# Patient Record
Sex: MALE | Race: WHITE | NOT HISPANIC OR LATINO
[De-identification: names, ages, dates, MRNs, and addresses within clinical notes are randomized per-mention and may not be internally consistent; named-entity substitution may affect disease eponyms.]

---

## 2019-01-08 PROBLEM — Z00.00 ENCOUNTER FOR PREVENTIVE HEALTH EXAMINATION: Status: ACTIVE | Noted: 2019-01-08

## 2019-01-11 ENCOUNTER — APPOINTMENT (OUTPATIENT)
Dept: VASCULAR SURGERY | Facility: CLINIC | Age: 84
End: 2019-01-11
Payer: MEDICARE

## 2019-01-11 PROCEDURE — 93880 EXTRACRANIAL BILAT STUDY: CPT

## 2019-01-11 PROCEDURE — 99203 OFFICE O/P NEW LOW 30 MIN: CPT

## 2019-01-18 NOTE — ASSESSMENT
[FreeTextEntry1] : carotid ultrasound shows no stenosis [Carotid Endarterectomy] : carotid endarterectomy

## 2019-01-18 NOTE — PHYSICAL EXAM
[2+] : left 2+ [Ankle Swelling (On Exam)] : not present [Varicose Veins Of Lower Extremities] : not present [] : not present [No Rash or Lesion] : No rash or lesion [Calm] : calm [de-identified] : pleasant

## 2019-01-18 NOTE — HISTORY OF PRESENT ILLNESS
[FreeTextEntry1] : pt comes in for evaluation of dizziness\par pt has been feeling light headed\par no weakness\par no visual changes\par no walking issues\par

## 2019-07-12 ENCOUNTER — EMERGENCY (EMERGENCY)
Facility: HOSPITAL | Age: 84
LOS: 1 days | Discharge: HOME | End: 2019-07-12
Attending: EMERGENCY MEDICINE | Admitting: EMERGENCY MEDICINE
Payer: MEDICARE

## 2019-07-12 VITALS
RESPIRATION RATE: 18 BRPM | HEART RATE: 55 BPM | SYSTOLIC BLOOD PRESSURE: 168 MMHG | OXYGEN SATURATION: 100 % | TEMPERATURE: 98 F | DIASTOLIC BLOOD PRESSURE: 66 MMHG

## 2019-07-12 VITALS
SYSTOLIC BLOOD PRESSURE: 179 MMHG | OXYGEN SATURATION: 99 % | TEMPERATURE: 98 F | HEIGHT: 69 IN | RESPIRATION RATE: 18 BRPM | DIASTOLIC BLOOD PRESSURE: 66 MMHG | WEIGHT: 165.79 LBS | HEART RATE: 66 BPM

## 2019-07-12 DIAGNOSIS — R07.81 PLEURODYNIA: ICD-10-CM

## 2019-07-12 DIAGNOSIS — S22.42XD MULTIPLE FRACTURES OF RIBS, LEFT SIDE, SUBSEQUENT ENCOUNTER FOR FRACTURE WITH ROUTINE HEALING: ICD-10-CM
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
## 2019-07-12 DIAGNOSIS — K86.9 DISEASE OF PANCREAS, UNSPECIFIED: ICD-10-CM

## 2019-07-12 PROCEDURE — 70450 CT HEAD/BRAIN W/O DYE: CPT | Mod: 26

## 2019-07-12 PROCEDURE — 99284 EMERGENCY DEPT VISIT MOD MDM: CPT | Mod: 25

## 2019-07-12 PROCEDURE — 71250 CT THORAX DX C-: CPT

## 2019-07-12 PROCEDURE — 70450 CT HEAD/BRAIN W/O DYE: CPT

## 2019-07-12 PROCEDURE — 73502 X-RAY EXAM HIP UNI 2-3 VIEWS: CPT

## 2019-07-12 PROCEDURE — 86901 BLOOD TYPING SEROLOGIC RH(D): CPT

## 2019-07-12 PROCEDURE — 86900 BLOOD TYPING SEROLOGIC ABO: CPT

## 2019-07-12 PROCEDURE — 85025 COMPLETE CBC W/AUTO DIFF WBC: CPT

## 2019-07-12 PROCEDURE — 85730 THROMBOPLASTIN TIME PARTIAL: CPT

## 2019-07-12 PROCEDURE — 36415 COLL VENOUS BLD VENIPUNCTURE: CPT

## 2019-07-12 PROCEDURE — 85610 PROTHROMBIN TIME: CPT

## 2019-07-12 PROCEDURE — 80053 COMPREHEN METABOLIC PANEL: CPT

## 2019-07-12 PROCEDURE — 73502 X-RAY EXAM HIP UNI 2-3 VIEWS: CPT | Mod: 26,LT

## 2019-07-12 PROCEDURE — 99284 EMERGENCY DEPT VISIT MOD MDM: CPT

## 2019-07-12 PROCEDURE — 86850 RBC ANTIBODY SCREEN: CPT

## 2019-07-12 PROCEDURE — 71250 CT THORAX DX C-: CPT | Mod: 26

## 2019-07-12 RX ORDER — TRAMADOL HYDROCHLORIDE 50 MG/1
1 TABLET ORAL
Qty: 12 | Refills: 0
Start: 2019-07-12 | End: 2019-07-14

## 2019-07-12 RX ORDER — TRAMADOL HYDROCHLORIDE 50 MG/1
50 TABLET ORAL ONCE
Refills: 0 | Status: DISCONTINUED | OUTPATIENT
Start: 2019-07-12 | End: 2019-07-12

## 2019-07-12 RX ADMIN — TRAMADOL HYDROCHLORIDE 50 MILLIGRAM(S): 50 TABLET ORAL at 16:57

## 2019-07-12 NOTE — ED PROVIDER NOTE - CLINICAL SUMMARY MEDICAL DECISION MAKING FREE TEXT BOX
Rib fractures s/p mechanical fall, sent to the ED for further eval. Will get CT chest for better evaluation, in addition to head CT in the setting on minor head injury in elderly, and add XR hip / pelvis. Analgesia. Dispo pending w/u and clinical status

## 2019-07-12 NOTE — ED PROVIDER NOTE - CARE PLAN
Principal Discharge DX:	Closed fracture of multiple ribs of left side with routine healing, subsequent encounter  Secondary Diagnosis:	Pancreatic mass

## 2019-07-12 NOTE — ED PROVIDER NOTE - NSFOLLOWUPINSTRUCTIONS_ED_ALL_ED_FT
You were referred to the ED for rib fractures. You had a CT of the chest which showed 2 acute rib fractures without complication. You had an incidental finding of cystic mass on the tail of the pancreas. This needs to be further evaluated as an outpatient by MRI. Please show your results to your primary doctor, Dr Ramirez. Your CT of the head, and xrays of the left hip, pelvis, and femur did not show any acute abnormalities. You are being prescribed Tramadol for pain. You have been given an incentive spirometer to practice deep breathing. Refrain from exercise for the next 4 weeks. Follow up with Dr Deal    Rib Fracture  Image   A rib fracture is a break or crack in one of the bones of the ribs. The ribs are long, curved bones that wrap around your chest and attach to your spine and your breastbone. The ribs protect your heart, lungs, and other organs in the chest.    A broken or cracked rib is often painful but is not usually serious. Most rib fractures heal on their own over time. However, rib fractures can be more serious if multiple ribs are broken or if broken ribs move out of place and push against other structures or organs.    What are the causes?  This condition is caused by:  Repetitive movements with high force, such as pitching a baseball or having severe coughing spells.  A direct blow to the chest, such as a sports injury, a car accident, or a fall.  Cancer that has spread to the bones, which can weaken bones and cause them to break.  What are the signs or symptoms?  Symptoms of this condition include:  Pain when you breathe in or cough.  Pain when someone presses on the injured area.  Feeling short of breath.  How is this diagnosed?  This condition is diagnosed with a physical exam and medical history. Imaging tests may also be done, such as:  Chest X-ray.  CT scan.  MRI.  Bone scan.  Chest ultrasound.  How is this treated?  Treatment for this condition depends on the severity of the fracture. Most rib fractures usually heal on their own in 1–3 months. Sometimes healing takes longer if there is a cough that does not stop or if there are other activities that make the injury worse (aggravating factors). While you heal, you will be given medicines to control the pain. You will also be taught deep breathing exercises.    Severe injuries may require hospitalization or surgery.    Follow these instructions at home:  Managing pain, stiffness, and swelling     If directed, apply ice to the injured area.  Put ice in a plastic bag.  Place a towel between your skin and the bag.  Leave the ice on for 20 minutes, 2–3 times a day.  Take over-the-counter and prescription medicines only as told by your health care provider.  Activity     Avoid a lot of activity and any activities or movements that cause pain. Be careful during activities and avoid bumping the injured rib.  Slowly increase your activity as told by your health care provider.  General instructions     Do deep breathing exercises as told by your health care provider. This helps prevent pneumonia, which is a common complication of a broken rib. Your health care provider may instruct you to:  Take deep breaths several times a day.  Try to cough several times a day, holding a pillow against the injured area.  Use a device called incentive spirometer to practice deep breathing several times a day.  Drink enough fluid to keep your urine pale yellow.  Do not wear a rib belt or binder. These restrict breathing, which can lead to pneumonia.  Keep all follow-up visits as told by your health care provider. This is important.  Contact a health care provider if:  You have a fever.  Get help right away if:  You have difficulty breathing or you are short of breath.  You develop a cough that does not stop, or you cough up thick or bloody sputum.  You have nausea, vomiting, or pain in your abdomen.  Your pain gets worse and medicine does not help.  Summary  A rib fracture is a break or crack in one of the bones of the ribs.  A broken or cracked rib is often painful but is not usually serious.  Most rib fractures heal on their own over time.  Treatment for this condition depends on the severity of the fracture.  Avoid a lot of activity and any activities or movements that cause pain.  This information is not intended to replace advice given to you by your health care provider. Make sure you discuss any questions you have with your health care provider.    How to Use an Incentive Spirometer    WHAT YOU NEED TO KNOW:    An incentive spirometer is a device that measures how deeply you can inhale (breathe in). It helps you take slow, deep breaths to expand and fill your lungs with air. This helps prevent lung problems, such as pneumonia. The incentive spirometer is made up of a breathing tube, an air chamber, and an indicator. The breathing tube is connected to the air chamber and has a mouthpiece at the end. The indicator is found inside the device.     DISCHARGE INSTRUCTIONS:    Reasons to use an incentive spirometer: An incentive spirometer is most commonly used after surgery. People who are at increased risk of airway or breathing problems may also use one. These include people who smoke or have lung disease. This may also include people who are not active or cannot move well.     How to use an incentive spirometer: Sit up as straight as possible. Do not bend your head forward or backward. Hold the incentive spirometer in an upright position. Place the target pointer to the level that you need to reach or that your healthcare provider has suggested. Exhale (breathe out) normally and then do the following:    Put the mouthpiece in your mouth and close your lips tightly around it. Do not block the mouthpiece with your tongue.       Inhale slowly and deeply through the mouthpiece to raise the indicator. Try to make the indicator rise up to the level of the goal marker.      When you cannot inhale any longer, remove the mouthpiece and hold your breath for at least 3 seconds.      Exhale normally.      Repeat these steps 10 to 12 times every hour when you are awake, or as often as directed.      Clean the mouthpiece with soap and water after each use. Do not use a disposable mouthpiece for longer than 24 hours.      Keep a log of the highest level you are able to reach each time. This will help healthcare providers see if your lung function improves.How to use and Incentive Spirometer         Follow up with your healthcare provider as directed: Write down your questions so you remember to ask them during your visits.     Contact your healthcare provider if:     You feel dizzy or lightheaded.      You have a wound that is painful every time you breathe deeply.      You have questions or concerns about how to use your IS.    Return to the emergency department if:     You have chest pain or shortness of breath.      You feel faint.

## 2019-07-12 NOTE — ED PROVIDER NOTE - OBJECTIVE STATEMENT
Pt w/ PMHx HLD, pre-DM, sent in from [facility] for rib fractures. Pt reports he was walking, tripped on uneven sidewalk, and fell forward, hitting his left chest and forehead. No LOC. Pt was helped up and able to ambulate of his own afterwards. No preceding CP, SOB, palpitations, or dizziness. Pt reports pain in the L chest wall only, and minimally L forehead s/p fall. No neck or back pain. Pt reports L "thigh" pain. Pt ambulated to [facility], where he had XR of the chest, L ribs, and L femur, was advised he had multiple rib fractures. PCP Dr Deal was called, and he was advised to come to the [facility] ED. Pt walked to the ED. Pt has not taken anything for pain. TDaP within 10 years. No AC use.

## 2019-07-12 NOTE — ED ADULT NURSE NOTE - NSIMPLEMENTINTERV_GEN_ALL_ED
Implemented All Universal Safety Interventions:  [place] to call system. Call bell, personal items and telephone within reach. Instruct patient to call for assistance. Room bathroom lighting operational. Non-slip footwear when patient is off stretcher. Physically safe environment: no spills, clutter or unnecessary equipment. Stretcher in lowest position, wheels locked, appropriate side rails in place.

## 2019-07-12 NOTE — ED PROVIDER NOTE - NS ED ROS FT
Constitutional: No fever or chills.   Eyes: No pain, blurry vision, or discharge.  ENMT: No hearing changes, pain, discharge or infections. No neck pain or stiffness.  Cardiac: No chest pain, SOB or edema. No chest pain with exertion.  Respiratory: No cough or respiratory distress. No hemoptysis. No history of asthma or RAD.  GI: No nausea, vomiting, diarrhea or abdominal pain.  : No dysuria, frequency or burning.  MS: See HPI.  Neuro: No headache or weakness. No LOC.  Skin: No skin rash.   Endocrine: No history of thyroid disease or diabetes.  Except as documented in the HPI, all other systems are negative.

## 2019-07-12 NOTE — ED PROVIDER NOTE - PROGRESS NOTE DETAILS
Outpt XR chest / rib series report "several L 3-7 ribs uncomplicated rib fx on CT. Incidental finding of pancreatic cystic mass told to pt, given a copy of report, advised of need for outpt MRI for further eval. At this time, ED rep attempting to contact PCP Dr Deal to notify as well. Pt is feeling better s/p analgesia and would like to go home. Will prescribe Tramadol, RN to give incentive spirometer. Advised to refrain from working out for the next 4 weeks. Outpt XR chest / rib series report "L 3-7 rib fractures"

## 2019-07-12 NOTE — ED ADULT TRIAGE NOTE - CHIEF COMPLAINT QUOTE
confirmed L rib Fx at urgent care after mechanical fall this am, denies CP/SOB at this time; EKG in progress

## 2019-07-12 NOTE — ED PROVIDER NOTE - DIAGNOSTIC INTERPRETATION
L hip / pelvis INTERPRETATION:  no acute fracture; no soft tissue swelling noted; normal bony alignment. L hip / pelvis INTERPRETATION:  no acute fracture; no soft tissue swelling noted; normal bony alignment.   Outpt Femur INTERPRETATION:  no acute fracture; no soft tissue swelling noted; normal bony alignment.

## 2019-07-12 NOTE — ED PROVIDER NOTE - PHYSICAL EXAMINATION
Constitutional: Well appearing, well nourished, awake, alert, oriented to person, place, time/situation and in no apparent distress.  Head: minimal abrasion just above L lateral eyebrow. No swelling or ecchymosis. Remainder of head / face unremarkable.   ENMT: Airway patent. Normal MM  Eyes: Clear bilaterally. PERRL. EOMI  Cardiac: Normal rate, regular rhythm.  Heart sounds S1, S2.  No murmurs, rubs or gallops.  Respiratory: Breaths sounds equal and clear b/l. No increased WOB, tachypnea, hypoxia, or accessory mm use. Pt speaks in full sentences.   Gastrointestinal: Abd soft, NT, ND, NABS. No guarding, rebound, or rigidity. No pulsatile abdominal masses. No organomegaly appreciated. No CVAT   Musculoskeletal: Range of motion is not limited. No midline spinal ttp throughout the spine. FROM neck w/o midline pain. FROM in all joints x 4 ext w/o signs of trauma or dec ROM. Pt points to "slight pain" in L thigh w/ ROM L hip only. Joints non tender. + mild ttp to palpation of L chest wall w/o visible signs of trauma, crepitus, or stepoffs.   Neuro: Alert and oriented x 3, face symmetric and speech fluent. Strength 5/5 x 4 ext and symmetric, nml gross motor movement, nml gait. No focal deficits noted.  Skin: Skin normal color for race, warm, dry and intact. No evidence of rash.  Psych: Alert and oriented to person, place, time/situation. normal mood and affect. no apparent risk to self or others.

## 2019-07-26 ENCOUNTER — TRANSCRIPTION ENCOUNTER (OUTPATIENT)
Age: 84
End: 2019-07-26

## 2020-07-31 NOTE — ED ADULT NURSE NOTE - CAS EDP DISCH TYPE
Visit date not found Last office visit was virtual on 5/19/2020, refill:    Date Department Ordering Authorizing   8/2/2019 [facility]-[address], MA David Laura MD   Outpatient Medication Detail      Disp Refills Start End    Olmesartan-amLODIPine-HCTZ (TRIBENZOR) 40-10-25 MG 90 tablet 3 8/2/2019     Sig: TAKE 1 TABLET DAILY IN THE MORNING      Recent elevated reading at specialty clinic, please advise.  Wt Readings from Last 1 Encounters:   07/28/20 64.2 kg        BP Readings from Last 2 Encounters:   07/28/20 (!) 145/71   06/03/20 128/70   ]    Lab Results   Component Value Date    SODIUM 135 07/28/2020    POTASSIUM 3.5 07/28/2020    CHLORIDE 96 (L) 07/28/2020    CO2 32 07/28/2020    BUN 14 07/28/2020    CREATININE 0.87 07/28/2020    GLUCOSE 105 (H) 07/28/2020     Hemoglobin A1C (%)   Date Value   05/15/2020 7.0 (H)   08/14/2019 6.3 (H)     No results found for: TSH  Lab Results   Component Value Date    CHOLESTEROL 145 02/20/2020    HDL 63 02/20/2020    CALCLDL 61 02/20/2020    TRIGLYCERIDE 106 02/20/2020     Lab Results   Component Value Date    AST 23 07/28/2020    GPT 38 07/28/2020    ALKPT 48 07/28/2020    BILIRUBIN 0.3 07/28/2020       
Home

## 2020-12-11 ENCOUNTER — EMERGENCY (EMERGENCY)
Facility: HOSPITAL | Age: 85
LOS: 1 days | Discharge: HOME | End: 2020-12-11
Attending: EMERGENCY MEDICINE | Admitting: EMERGENCY MEDICINE
Payer: MEDICARE

## 2020-12-11 VITALS
TEMPERATURE: 99 F | OXYGEN SATURATION: 97 % | HEIGHT: 69 IN | DIASTOLIC BLOOD PRESSURE: 69 MMHG | SYSTOLIC BLOOD PRESSURE: 161 MMHG | WEIGHT: 160.06 LBS | RESPIRATION RATE: 18 BRPM | HEART RATE: 86 BPM

## 2020-12-11 VITALS
OXYGEN SATURATION: 96 % | TEMPERATURE: 98 F | HEART RATE: 67 BPM | SYSTOLIC BLOOD PRESSURE: 139 MMHG | DIASTOLIC BLOOD PRESSURE: 67 MMHG | RESPIRATION RATE: 18 BRPM

## 2020-12-11 LAB
ALBUMIN SERPL ELPH-MCNC: 4.3 G/DL — SIGNIFICANT CHANGE UP (ref 3.3–5)
ALP SERPL-CCNC: 34 U/L — LOW (ref 40–120)
ALT FLD-CCNC: 24 U/L — SIGNIFICANT CHANGE UP (ref 10–45)
ANION GAP SERPL CALC-SCNC: 13 MMOL/L — SIGNIFICANT CHANGE UP (ref 5–17)
AST SERPL-CCNC: 19 U/L — SIGNIFICANT CHANGE UP (ref 10–40)
BASOPHILS # BLD AUTO: 0.03 K/UL — SIGNIFICANT CHANGE UP (ref 0–0.2)
BASOPHILS NFR BLD AUTO: 0.2 % — SIGNIFICANT CHANGE UP (ref 0–2)
BILIRUB SERPL-MCNC: 0.5 MG/DL — SIGNIFICANT CHANGE UP (ref 0.2–1.2)
BUN SERPL-MCNC: 25 MG/DL — HIGH (ref 7–23)
CALCIUM SERPL-MCNC: 9.6 MG/DL — SIGNIFICANT CHANGE UP (ref 8.4–10.5)
CHLORIDE SERPL-SCNC: 103 MMOL/L — SIGNIFICANT CHANGE UP (ref 96–108)
CK MB CFR SERPL CALC: 2.5 NG/ML — SIGNIFICANT CHANGE UP (ref 0–6.7)
CK SERPL-CCNC: 63 U/L — SIGNIFICANT CHANGE UP (ref 30–200)
CO2 SERPL-SCNC: 25 MMOL/L — SIGNIFICANT CHANGE UP (ref 22–31)
CREAT SERPL-MCNC: 1.03 MG/DL — SIGNIFICANT CHANGE UP (ref 0.5–1.3)
EOSINOPHIL # BLD AUTO: 0.01 K/UL — SIGNIFICANT CHANGE UP (ref 0–0.5)
EOSINOPHIL NFR BLD AUTO: 0.1 % — SIGNIFICANT CHANGE UP (ref 0–6)
GLUCOSE SERPL-MCNC: 138 MG/DL — HIGH (ref 70–99)
HCT VFR BLD CALC: 36.9 % — LOW (ref 39–50)
HGB BLD-MCNC: 12.2 G/DL — LOW (ref 13–17)
IMM GRANULOCYTES NFR BLD AUTO: 0.6 % — SIGNIFICANT CHANGE UP (ref 0–1.5)
LIDOCAIN IGE QN: 12 U/L — SIGNIFICANT CHANGE UP (ref 7–60)
LYMPHOCYTES # BLD AUTO: 1.05 K/UL — SIGNIFICANT CHANGE UP (ref 1–3.3)
LYMPHOCYTES # BLD AUTO: 8.7 % — LOW (ref 13–44)
MCHC RBC-ENTMCNC: 31.4 PG — SIGNIFICANT CHANGE UP (ref 27–34)
MCHC RBC-ENTMCNC: 33.1 GM/DL — SIGNIFICANT CHANGE UP (ref 32–36)
MCV RBC AUTO: 94.9 FL — SIGNIFICANT CHANGE UP (ref 80–100)
MONOCYTES # BLD AUTO: 1.4 K/UL — HIGH (ref 0–0.9)
MONOCYTES NFR BLD AUTO: 11.6 % — SIGNIFICANT CHANGE UP (ref 2–14)
NEUTROPHILS # BLD AUTO: 9.48 K/UL — HIGH (ref 1.8–7.4)
NEUTROPHILS NFR BLD AUTO: 78.8 % — HIGH (ref 43–77)
NRBC # BLD: 0 /100 WBCS — SIGNIFICANT CHANGE UP (ref 0–0)
PLATELET # BLD AUTO: 196 K/UL — SIGNIFICANT CHANGE UP (ref 150–400)
POTASSIUM SERPL-MCNC: 4 MMOL/L — SIGNIFICANT CHANGE UP (ref 3.5–5.3)
POTASSIUM SERPL-SCNC: 4 MMOL/L — SIGNIFICANT CHANGE UP (ref 3.5–5.3)
PROT SERPL-MCNC: 6.9 G/DL — SIGNIFICANT CHANGE UP (ref 6–8.3)
RBC # BLD: 3.89 M/UL — LOW (ref 4.2–5.8)
RBC # FLD: 12.7 % — SIGNIFICANT CHANGE UP (ref 10.3–14.5)
SODIUM SERPL-SCNC: 141 MMOL/L — SIGNIFICANT CHANGE UP (ref 135–145)
TROPONIN T SERPL-MCNC: 0.01 NG/ML — SIGNIFICANT CHANGE UP (ref 0–0.01)
WBC # BLD: 12.04 K/UL — HIGH (ref 3.8–10.5)
WBC # FLD AUTO: 12.04 K/UL — HIGH (ref 3.8–10.5)

## 2020-12-11 PROCEDURE — 99284 EMERGENCY DEPT VISIT MOD MDM: CPT | Mod: 25

## 2020-12-11 PROCEDURE — 82553 CREATINE MB FRACTION: CPT

## 2020-12-11 PROCEDURE — 84484 ASSAY OF TROPONIN QUANT: CPT

## 2020-12-11 PROCEDURE — 93010 ELECTROCARDIOGRAM REPORT: CPT

## 2020-12-11 PROCEDURE — 84145 PROCALCITONIN (PCT): CPT

## 2020-12-11 PROCEDURE — 36415 COLL VENOUS BLD VENIPUNCTURE: CPT

## 2020-12-11 PROCEDURE — 96375 TX/PRO/DX INJ NEW DRUG ADDON: CPT | Mod: XU

## 2020-12-11 PROCEDURE — 94640 AIRWAY INHALATION TREATMENT: CPT

## 2020-12-11 PROCEDURE — 96374 THER/PROPH/DIAG INJ IV PUSH: CPT | Mod: XU

## 2020-12-11 PROCEDURE — 71275 CT ANGIOGRAPHY CHEST: CPT | Mod: 26

## 2020-12-11 PROCEDURE — 71045 X-RAY EXAM CHEST 1 VIEW: CPT | Mod: 26

## 2020-12-11 PROCEDURE — 71045 X-RAY EXAM CHEST 1 VIEW: CPT

## 2020-12-11 PROCEDURE — 93005 ELECTROCARDIOGRAM TRACING: CPT

## 2020-12-11 PROCEDURE — 71275 CT ANGIOGRAPHY CHEST: CPT

## 2020-12-11 PROCEDURE — 86140 C-REACTIVE PROTEIN: CPT

## 2020-12-11 PROCEDURE — 82728 ASSAY OF FERRITIN: CPT

## 2020-12-11 PROCEDURE — 99285 EMERGENCY DEPT VISIT HI MDM: CPT

## 2020-12-11 PROCEDURE — 82550 ASSAY OF CK (CPK): CPT

## 2020-12-11 PROCEDURE — 80053 COMPREHEN METABOLIC PANEL: CPT

## 2020-12-11 PROCEDURE — 85025 COMPLETE CBC W/AUTO DIFF WBC: CPT

## 2020-12-11 PROCEDURE — 85379 FIBRIN DEGRADATION QUANT: CPT

## 2020-12-11 PROCEDURE — 83690 ASSAY OF LIPASE: CPT

## 2020-12-11 PROCEDURE — U0003: CPT

## 2020-12-11 RX ORDER — SODIUM CHLORIDE 9 MG/ML
1000 INJECTION INTRAMUSCULAR; INTRAVENOUS; SUBCUTANEOUS
Refills: 0 | Status: DISCONTINUED | OUTPATIENT
Start: 2020-12-11 | End: 2020-12-15

## 2020-12-11 RX ORDER — DEXAMETHASONE 0.5 MG/5ML
6 ELIXIR ORAL ONCE
Refills: 0 | Status: COMPLETED | OUTPATIENT
Start: 2020-12-11 | End: 2020-12-11

## 2020-12-11 RX ORDER — ALBUTEROL 90 UG/1
1 AEROSOL, METERED ORAL ONCE
Refills: 0 | Status: COMPLETED | OUTPATIENT
Start: 2020-12-11 | End: 2020-12-11

## 2020-12-11 RX ORDER — KETOROLAC TROMETHAMINE 30 MG/ML
15 SYRINGE (ML) INJECTION ONCE
Refills: 0 | Status: DISCONTINUED | OUTPATIENT
Start: 2020-12-11 | End: 2020-12-11

## 2020-12-11 RX ORDER — ACETAMINOPHEN 500 MG
650 TABLET ORAL ONCE
Refills: 0 | Status: COMPLETED | OUTPATIENT
Start: 2020-12-11 | End: 2020-12-11

## 2020-12-11 RX ADMIN — Medication 650 MILLIGRAM(S): at 18:56

## 2020-12-11 RX ADMIN — Medication 15 MILLIGRAM(S): at 21:40

## 2020-12-11 RX ADMIN — ALBUTEROL 1 PUFF(S): 90 AEROSOL, METERED ORAL at 22:33

## 2020-12-11 RX ADMIN — SODIUM CHLORIDE 500 MILLILITER(S): 9 INJECTION INTRAMUSCULAR; INTRAVENOUS; SUBCUTANEOUS at 20:21

## 2020-12-11 RX ADMIN — Medication 6 MILLIGRAM(S): at 22:33

## 2020-12-11 NOTE — ED ADULT NURSE NOTE - OBJECTIVE STATEMENT
Patient received to bed 5, A&Ox3, respirations even and unlabored, ambulatory, bilateral hearing aids observed, speaks in clear sentences. Patient arrives with complaints of non-radiating chest pain with worsening on deep inspiration x 1 day, prompting ED visit. Patient reports last fall "a few months ago I tripped and hit my nose, I got checked out by the hospital and was fine". Patient denies Hx MI. Left Ac 18g IV placed labs drawn and sent.

## 2020-12-11 NOTE — ED PROVIDER NOTE - PATIENT PORTAL LINK FT
You can access the FollowMyHealth Patient Portal offered by [facility] by registering at the following website: [email or web address]. By joining [facility]’s FollowMyHealth portal, you will also be able to view your health information using other applications (apps) compatible with our system.

## 2020-12-11 NOTE — ED PROVIDER NOTE - CLINICAL SUMMARY MEDICAL DECISION MAKING FREE TEXT BOX
86 y/o M with pleuritic chest pain, check labs, CXR, tylenol pO given. Will get CTA chest PE study and reassess.

## 2020-12-12 ENCOUNTER — TRANSCRIPTION ENCOUNTER (OUTPATIENT)
Age: 85
End: 2020-12-12

## 2020-12-12 LAB — SARS-COV-2 RNA SPEC QL NAA+PROBE: SIGNIFICANT CHANGE UP

## 2020-12-15 DIAGNOSIS — R07.89 OTHER CHEST PAIN: ICD-10-CM

## 2020-12-15 DIAGNOSIS — Z20.828 CONTACT WITH AND (SUSPECTED) EXPOSURE TO OTHER VIRAL COMMUNICABLE DISEASES: ICD-10-CM

## 2020-12-22 ENCOUNTER — INPATIENT (INPATIENT)
Facility: HOSPITAL | Age: 85
LOS: 3 days | Discharge: HOME | DRG: 178 | End: 2020-12-26
Attending: INTERNAL MEDICINE | Admitting: INTERNAL MEDICINE
Payer: MEDICARE

## 2020-12-22 VITALS
SYSTOLIC BLOOD PRESSURE: 136 MMHG | TEMPERATURE: 98 F | DIASTOLIC BLOOD PRESSURE: 66 MMHG | HEART RATE: 96 BPM | WEIGHT: 160.06 LBS | RESPIRATION RATE: 18 BRPM | OXYGEN SATURATION: 100 % | HEIGHT: 69 IN

## 2020-12-22 PROBLEM — N40.0 BENIGN PROSTATIC HYPERPLASIA WITHOUT LOWER URINARY TRACT SYMPTOMS: Chronic | Status: ACTIVE | Noted: 2020-12-11

## 2020-12-22 PROBLEM — E11.9 TYPE 2 DIABETES MELLITUS WITHOUT COMPLICATIONS: Chronic | Status: ACTIVE | Noted: 2020-12-11

## 2020-12-22 LAB
ALBUMIN SERPL ELPH-MCNC: 3.9 G/DL — SIGNIFICANT CHANGE UP (ref 3.3–5)
ALP SERPL-CCNC: 55 U/L — SIGNIFICANT CHANGE UP (ref 40–120)
ALT FLD-CCNC: 92 U/L — HIGH (ref 10–45)
ANION GAP SERPL CALC-SCNC: 15 MMOL/L — SIGNIFICANT CHANGE UP (ref 5–17)
APTT BLD: 23 SEC — LOW (ref 27.5–35.5)
AST SERPL-CCNC: 60 U/L — HIGH (ref 10–40)
BASOPHILS # BLD AUTO: 0.03 K/UL — SIGNIFICANT CHANGE UP (ref 0–0.2)
BASOPHILS NFR BLD AUTO: 0.2 % — SIGNIFICANT CHANGE UP (ref 0–2)
BILIRUB SERPL-MCNC: 0.5 MG/DL — SIGNIFICANT CHANGE UP (ref 0.2–1.2)
BUN SERPL-MCNC: 74 MG/DL — HIGH (ref 7–23)
CALCIUM SERPL-MCNC: 10.5 MG/DL — SIGNIFICANT CHANGE UP (ref 8.4–10.5)
CHLORIDE SERPL-SCNC: 101 MMOL/L — SIGNIFICANT CHANGE UP (ref 96–108)
CO2 SERPL-SCNC: 23 MMOL/L — SIGNIFICANT CHANGE UP (ref 22–31)
CREAT SERPL-MCNC: 2.11 MG/DL — HIGH (ref 0.5–1.3)
CRP SERPL-MCNC: 21.38 MG/DL — HIGH (ref 0–0.4)
D DIMER BLD IA.RAPID-MCNC: 1126 NG/ML DDU — HIGH
EOSINOPHIL # BLD AUTO: 0.04 K/UL — SIGNIFICANT CHANGE UP (ref 0–0.5)
EOSINOPHIL NFR BLD AUTO: 0.3 % — SIGNIFICANT CHANGE UP (ref 0–6)
GLUCOSE BLDC GLUCOMTR-MCNC: 121 MG/DL — HIGH (ref 70–99)
GLUCOSE SERPL-MCNC: 140 MG/DL — HIGH (ref 70–99)
HCT VFR BLD CALC: 32.8 % — LOW (ref 39–50)
HGB BLD-MCNC: 10.6 G/DL — LOW (ref 13–17)
IMM GRANULOCYTES NFR BLD AUTO: 0.6 % — SIGNIFICANT CHANGE UP (ref 0–1.5)
INR BLD: 1.29 — HIGH (ref 0.88–1.16)
LACTATE SERPL-SCNC: 0.7 MMOL/L — SIGNIFICANT CHANGE UP (ref 0.5–2)
LDH SERPL L TO P-CCNC: 341 U/L — HIGH (ref 50–242)
LYMPHOCYTES # BLD AUTO: 1.17 K/UL — SIGNIFICANT CHANGE UP (ref 1–3.3)
LYMPHOCYTES # BLD AUTO: 9 % — LOW (ref 13–44)
MCHC RBC-ENTMCNC: 31.2 PG — SIGNIFICANT CHANGE UP (ref 27–34)
MCHC RBC-ENTMCNC: 32.3 GM/DL — SIGNIFICANT CHANGE UP (ref 32–36)
MCV RBC AUTO: 96.5 FL — SIGNIFICANT CHANGE UP (ref 80–100)
MONOCYTES # BLD AUTO: 1.14 K/UL — HIGH (ref 0–0.9)
MONOCYTES NFR BLD AUTO: 8.8 % — SIGNIFICANT CHANGE UP (ref 2–14)
NEUTROPHILS # BLD AUTO: 10.47 K/UL — HIGH (ref 1.8–7.4)
NEUTROPHILS NFR BLD AUTO: 81.1 % — HIGH (ref 43–77)
NRBC # BLD: 0 /100 WBCS — SIGNIFICANT CHANGE UP (ref 0–0)
NT-PROBNP SERPL-SCNC: 1004 PG/ML — HIGH (ref 0–300)
PLATELET # BLD AUTO: 274 K/UL — SIGNIFICANT CHANGE UP (ref 150–400)
POTASSIUM SERPL-MCNC: 5.4 MMOL/L — HIGH (ref 3.5–5.3)
POTASSIUM SERPL-SCNC: 5.4 MMOL/L — HIGH (ref 3.5–5.3)
PROCALCITONIN SERPL-MCNC: 0.2 NG/ML — HIGH (ref 0.02–0.1)
PROT SERPL-MCNC: 7.2 G/DL — SIGNIFICANT CHANGE UP (ref 6–8.3)
PROTHROM AB SERPL-ACNC: 15.3 SEC — HIGH (ref 10.6–13.6)
RBC # BLD: 3.4 M/UL — LOW (ref 4.2–5.8)
RBC # FLD: 13.2 % — SIGNIFICANT CHANGE UP (ref 10.3–14.5)
SARS-COV-2 RNA SPEC QL NAA+PROBE: DETECTED
SODIUM SERPL-SCNC: 139 MMOL/L — SIGNIFICANT CHANGE UP (ref 135–145)
TROPONIN T SERPL-MCNC: 0.01 NG/ML — SIGNIFICANT CHANGE UP (ref 0–0.01)
TROPONIN T SERPL-MCNC: 0.04 NG/ML — CRITICAL HIGH (ref 0–0.01)
WBC # BLD: 12.93 K/UL — HIGH (ref 3.8–10.5)
WBC # FLD AUTO: 12.93 K/UL — HIGH (ref 3.8–10.5)

## 2020-12-22 PROCEDURE — 99285 EMERGENCY DEPT VISIT HI MDM: CPT

## 2020-12-22 PROCEDURE — 71045 X-RAY EXAM CHEST 1 VIEW: CPT | Mod: 26

## 2020-12-22 RX ORDER — COLCHICINE 0.6 MG
0.6 TABLET ORAL DAILY
Refills: 0 | Status: DISCONTINUED | OUTPATIENT
Start: 2020-12-22 | End: 2020-12-23

## 2020-12-22 RX ORDER — HEPARIN SODIUM 5000 [USP'U]/ML
5000 INJECTION INTRAVENOUS; SUBCUTANEOUS EVERY 12 HOURS
Refills: 0 | Status: DISCONTINUED | OUTPATIENT
Start: 2020-12-22 | End: 2020-12-22

## 2020-12-22 RX ORDER — SODIUM CHLORIDE 9 MG/ML
1000 INJECTION INTRAMUSCULAR; INTRAVENOUS; SUBCUTANEOUS
Refills: 0 | Status: DISCONTINUED | OUTPATIENT
Start: 2020-12-22 | End: 2020-12-23

## 2020-12-22 RX ORDER — ENOXAPARIN SODIUM 100 MG/ML
30 INJECTION SUBCUTANEOUS EVERY 24 HOURS
Refills: 0 | Status: DISCONTINUED | OUTPATIENT
Start: 2020-12-22 | End: 2020-12-23

## 2020-12-22 RX ORDER — CHLORHEXIDINE GLUCONATE 213 G/1000ML
1 SOLUTION TOPICAL
Refills: 0 | Status: DISCONTINUED | OUTPATIENT
Start: 2020-12-22 | End: 2020-12-23

## 2020-12-22 RX ORDER — SODIUM CHLORIDE 9 MG/ML
1000 INJECTION INTRAMUSCULAR; INTRAVENOUS; SUBCUTANEOUS ONCE
Refills: 0 | Status: COMPLETED | OUTPATIENT
Start: 2020-12-22 | End: 2020-12-22

## 2020-12-22 RX ORDER — INSULIN LISPRO 100/ML
VIAL (ML) SUBCUTANEOUS
Refills: 0 | Status: DISCONTINUED | OUTPATIENT
Start: 2020-12-22 | End: 2020-12-26

## 2020-12-22 RX ADMIN — SODIUM CHLORIDE 1000 MILLILITER(S): 9 INJECTION INTRAMUSCULAR; INTRAVENOUS; SUBCUTANEOUS at 17:00

## 2020-12-22 RX ADMIN — SODIUM CHLORIDE 1000 MILLILITER(S): 9 INJECTION INTRAMUSCULAR; INTRAVENOUS; SUBCUTANEOUS at 15:58

## 2020-12-22 RX ADMIN — ENOXAPARIN SODIUM 30 MILLIGRAM(S): 100 INJECTION SUBCUTANEOUS at 23:16

## 2020-12-22 RX ADMIN — SODIUM CHLORIDE 125 MILLILITER(S): 9 INJECTION INTRAMUSCULAR; INTRAVENOUS; SUBCUTANEOUS at 16:50

## 2020-12-22 RX ADMIN — Medication 0.6 MILLIGRAM(S): at 22:02

## 2020-12-22 NOTE — CHART NOTE - NSCHARTNOTEFT_GEN_A_CORE
Limited TTE by cardiology fellow on call for effusion  - Normal biventricular function  - Large circumferential pericardial effusion more prominent along R heart chambers  - Systolic collapse of RA  - Mild diastolic invagination of RV  - 50% respiratory variation of mitral E inflow velocity  - Above findings are consistent with early echocardiographic tamponade    D/w cardiology echo attending  Keshawn Hedrick MD PGY4

## 2020-12-22 NOTE — ED PROVIDER NOTE - OBJECTIVE STATEMENT
84 y/o m with PMH of DM, BPH presents to ED with generalized weakness x 2 weeks (seen on 12/10) for similar complaints as well as nonspecific chest pain.  Pt denies fever, chills, vomiting, diarrhea, urinary symptoms.  Had COVID swab on 12/10 which was negative.  Visited PMD today Dr. Eliel Deal who sent pt to ER for further evaluation.  As per nurse, when patient was walking to bed, he was unsteady on feet.  Does not usually walk with assistance.  Denies one sided weakness, numbness.  No headache or neck pain.

## 2020-12-22 NOTE — H&P ADULT - NSICDXPASTMEDICALHX_GEN_ALL_CORE_FT
PAST MEDICAL HISTORY:  BPH (benign prostatic hyperplasia)     DM (diabetes mellitus)     Hyperlipidemia

## 2020-12-22 NOTE — ED ADULT TRIAGE NOTE - CHIEF COMPLAINT QUOTE
Pt c/o mid sternal CP that began a few days ago associated w/ generalized weakness. Pt was evaluated by PMD who sent to ED. Pt reports he has been to ED twice for similar sx. Denies fever, chills, SOB, NVD, SOB, loss of taste or smell, sore throat.

## 2020-12-22 NOTE — H&P ADULT - HISTORY OF PRESENT ILLNESS
IN PROGRESS   IN PROGRESS  Pt c/o mid sternal CP that began a few days ago associated w/ generalized weakness. Pt was evaluated by PMD who sent to ED. Pt reports he has been to ED twice for similar sx. Denies fever, chills, SOB, NVD, SOB, loss of taste or smell, sore throa  T 97.8, HR 96, /66, RR 18, Spo2 ·    84 y/o m with PMH of DM, BPH presents to ED with generalized weakness x 2 weeks (seen on 12/10) for similar complaints as well as nonspecific chest pain.  Pt denies fever, chills, vomiting, diarrhea, urinary symptoms.  Had COVID swab on 12/10 which was negative.  Visited PMD today Dr. Eliel Deal who sent pt to ER for further evaluation.  As per nurse, when patient was walking to bed, he was unsteady on feet.  Does not usually walk with assistance.  Denies one sided weakness, numbness.  No headache or neck pain.  EKG showing NSR, Rate 94, , QRS 86, diffuse elevated ST/t waves     IN PROGRESS  86 y/o m with PMH of DM, BPH presents to ED with generalized weakness x 2 weeks (seen on 12/10) for similar complaints as well as nonspecific chest pain.  Pt denies fever, chills, vomiting, diarrhea, urinary symptoms.  Had COVID swab on 12/10 which was negative.  Visited PMD today Dr. Eliel Deal who sent pt to ER for further evaluation.  As per nurse, when patient was walking to bed, he was unsteady on feet.  Does not usually walk with assistance.  Denies one sided weakness, numbness.  No headache or neck pain.    Pt c/o mid sternal CP that began a few days ago associated w/ generalized weakness. Pt was evaluated by PMD who sent to ED. Pt reports he has been to ED twice for similar sx. Denies fever, chills, SOB, NVD, SOB, loss of taste or smell, sore throat    Chest pain started 2 weeks ago, non-radiating, sharp a/w only deep inspirations, not exertional, not positional/ on palpation, 5/10 on deep inspirations.   Says he is really active and goes to the gym daily where he does treadmill/eliptical work.   Besides some nonproductive cough, denied any F/C/SOB/non-pleuritic CP, Palpitations, Abd pain, N/V/changes in bowel/bladder habits. Denied any known sick contacts w/ COVID but endorses that until his CP sx began, had been going to his [employer] gym on a daily basis.     ED Course:  T 97.8, HR 96, /66, RR 18, Spo2 100% on RA  Labs notable for e  Imaging: Xray   EKG showing NSR, Rate 94, , QRS 86, diffuse elevated ST/t waves       IN PROGRESS  84 y/o m with PMH of DM, HLD, BPH presented to ED for CP.  Pt recently had COVID swab 12/10 which was negative.   Pt has had CP for the last 2-3 weeks, with no apparent inciting event. Described as non-radiating, sharp a/w only deep inspirations (not exertional, not positional), relieved when he takes shallower breaths, 5/10 pain scale on deep inspirations, stated that has been consistent i.e. not worsened recently. Denied any limits on exercise tolerance or any pain with exercise, stated that he is really active and goes to the gym daily where he does treadmill/eliptical work, which he did until this past weekend of Dec 18-19th. Also endorsed some nonproductive cough during this period as well, but denied any loss of taste or smell, sore throat, runny nose, or any symptoms of malaise/feeling unwell. Denied F/C/SOB/non-pleuritic CP, Palpitations, Abd pain, N/V/changes in bowel/bladder habits. Denied any known sick contacts w/ COVID but as above, had been going to his [employer] gym on a daily basis. On day of adm, he visited his PCP Dr. Eliel Deal who sent pt to ER for further evaluation.  Also per earlier documentation this adm, when patient was walking to bed, he was unsteady on feet; on talking to pt in ED on adm, he does say he has a hx of falls in the past as well as unsteadiness which he has managed by being more careful with watching where he steps and taking time to get up slowly from bed.  Does not walk with assistance.  Denied any new numbness/weakness/HA.     PMH: HLD, DM, BPH  PSH: None  Meds: Takes tamsulosin, metformin, fenofibrate  Allergies: NKDA  FH: None  SH: [address] in [address] , is independent at home, goes to gym daily to exercise on elliptical and treadmill, has not smoked a cigarette since he was 17, drinks around 1 glass (usually wine) daily, no hx of drug use.    ED Course:  T 97.8, HR 96, /66, RR 18, Spo2 100% on RA  Labs showing Trop T 0.04 (later 0.01) WBC 12.93, Hb 10.6, INR 1.29, PT 15.3, PTT 23, DD 1126, CRP 21.28, Lactate 0.7, K 5.4, Creat 2.11, BUN 74, AST 60, ALT 92, procal 0.20, Serum BNP 1004, COVID + (was previously negative 12/11/2020)  Imaging: Xray showing no acute pathology.  Bedside TTE showing n/l BIV function, large circumferential pericardial effusion more prominent along R heart chambers, systolic collapse of RA, mild diastolic invagination of RV, 50% respiratory variation of mitral E inflow velocity, findings c/w early echocardiographic tamponade; EKG showing NSR, Rate 94, , QRS 86, diffuse elevated ST/t waves  Received NS 1L x1, then put on NS @ 125 cc/hr. Then started per CCU team on colchicine 0.6 mg QD and Lovenox 30 mg Q24       IN PROGRESS  84 y/o m with PMH of DM, HLD, BPH presented to ED for CP.  Pt recently had COVID swab 12/10 which was negative.   Pt has had CP for the last 2-3 weeks, with no apparent inciting event. Described as non-radiating, sharp a/w only deep inspirations (not exertional, not positional), relieved when he takes shallower breaths, 5/10 pain scale on deep inspirations, stated that has been consistent i.e. not worsened recently. Denied any limits on exercise tolerance or any pain with exercise, stated that he is really active and goes to the gym daily where he does treadmill/eliptical work, which he did until this past weekend of Dec 18-19th. Also endorsed some nonproductive cough during this period as well, but denied any loss of taste or smell, sore throat, runny nose, or any symptoms of malaise/feeling unwell/ myalgias. Denied F/C/SOB/non-pleuritic CP, Palpitations, Abd pain, N/V/changes in bowel/bladder habits. Denied any known sick contacts w/ COVID but as above, had been going to his [employer] gym on a daily basis; has a GF who goes out into community, but was asymptomatic. On day of adm, he visited his PCP Dr. Eliel Deal who sent pt to ER for further evaluation.   Also per earlier documentation this adm, when patient was walking to bed, he was unsteady on feet; on talking to pt in ED on adm, he does say he has a hx of falls in the past as well as unsteadiness which he has managed by being more careful with watching where he steps and taking time to get up slowly from bed.  Does not walk with assistance.  Denied any new numbness/weakness/HA.     PMH: HLD, DM, BPH  PSH: None  Meds: Takes tamsulosin, metformin, fenofibrate  Allergies: NKDA  FH: None  SH: Lives in an [address], is independent at home, goes to gym daily to exercise on elliptical and treadmill, has not smoked a cigarette since he was 17, drinks around 1 glass (usually wine) daily, no hx of drug use.    ED Course:  T 97.8, HR 96, /66, RR 18, Spo2 100% on RA  Labs showing Trop T 0.04 (later 0.01) WBC 12.93, Hb 10.6, INR 1.29, PT 15.3, PTT 23, DD 1126, CRP 21.28, Lactate 0.7, K 5.4, Creat 2.11, BUN 74, AST 60, ALT 92, procal 0.20, Serum BNP 1004, COVID + (was previously negative 12/11/2020)  Imaging: Xray showing no acute pathology.  Bedside TTE showing n/l BIV function, large circumferential pericardial effusion more prominent along R heart chambers, systolic collapse of RA, mild diastolic invagination of RV, 50% respiratory variation of mitral E inflow velocity, findings c/w early echocardiographic tamponade; EKG showing NSR, Rate 94, , QRS 86, diffuse elevated ST/t waves  Received NS 1L x1, then put on NS @ 125 cc/hr. Then started per CCU team on colchicine 0.6 mg QD and Lovenox 30 mg Q24       86 y/o m with PMH of DM, HLD, BPH presented to ED for CP.  Pt recently had COVID swab 12/10 which was negative.   Pt has had CP for the last 2-3 weeks, with no apparent inciting event. Described as non-radiating, sharp a/w only deep inspirations (not exertional, not positional), relieved when he takes shallower breaths, 5/10 pain scale on deep inspirations, stated that has been consistent i.e. not worsened recently. Denied any limits on exercise tolerance or any pain with exercise, stated that he is really active and goes to the gym daily where he does treadmill/eliptical work, which he did until this past weekend of Dec 18-19th. Also endorsed some nonproductive cough during this period as well, but denied any loss of taste or smell, sore throat, runny nose, or any symptoms of malaise/feeling unwell/ myalgias. Denied F/C/SOB/non-pleuritic CP, Palpitations, Abd pain, N/V/changes in bowel/bladder habits. Denied any known sick contacts w/ COVID but as above, had been going to his [employer] gym on a daily basis; has a GF who goes out into community, but was asymptomatic. On day of adm, he visited his PCP Dr. Eliel Deal who sent pt to ER for further evaluation.   Also per earlier documentation this adm, when patient was walking to bed, he was unsteady on feet; on talking to pt in ED on adm, he does say he has a hx of falls in the past as well as unsteadiness which he has managed by being more careful with watching where he steps and taking time to get up slowly from bed.  Does not walk with assistance.  Denied any new numbness/weakness/HA.     PMH: HLD, DM, BPH  PSH: None  Meds: Documented tamsulosin, metformin, fenofibrate, pt stated he takes multivitamins and said yes when asked whether he takes medications for his prostate, DM, cholesterol, but will need formal med rec.   Allergies: NKDA  FH: None  SH: Lives in [address] , is independent at home, goes to gym daily to exercise on elliptical and treadmill, has not smoked a cigarette since he was 17, drinks around 1 glass (usually wine) daily, no hx of drug use.    ED Course:  T 97.8, HR 96, /66, RR 18, Spo2 100% on RA  Labs showing Trop T 0.04 (later 0.01) WBC 12.93, Hb 10.6, INR 1.29, PT 15.3, PTT 23, DD 1126, CRP 21.28, Lactate 0.7, K 5.4, Creat 2.11, BUN 74, AST 60, ALT 92, procal 0.20, Serum BNP 1004, COVID + (was previously negative 12/11/2020)  Imaging: Xray showing no acute pathology.  Bedside TTE showing n/l BIV function, large circumferential pericardial effusion more prominent along R heart chambers, systolic collapse of RA, mild diastolic invagination of RV, 50% respiratory variation of mitral E inflow velocity, findings c/w early echocardiographic tamponade; EKG showing NSR, Rate 94, , QRS 86, diffuse elevated ST/t waves  Received NS 1L x1, then put on NS @ 125 cc/hr. Then started per CCU team on colchicine 0.6 mg QD and Lovenox 30 mg Q24       84 y/o m with PMH of DM, HLD, BPH presented to ED for CP.  Pt recently had COVID swab 12/10 which was negative.   Pt has had CP for the last 2-3 weeks, with no apparent inciting event. Described as non-radiating, sharp a/w only deep inspirations (not exertional, not positional), relieved when he takes shallower breaths, 5/10 pain scale on deep inspirations, stated that has been consistent i.e. not worsened recently. Denied any limits on exercise tolerance or any pain with exercise, stated that he is really active and goes to the gym daily where he does treadmill/eliptical work, which he did until this past weekend of Dec 18-19th. Also endorsed some nonproductive cough during this period as well, but denied any loss of taste or smell, sore throat, runny nose, or any symptoms of malaise/feeling unwell/ myalgias. Denied F/C/SOB/non-pleuritic CP, Palpitations, Abd pain, N/V/changes in bowel/bladder habits. Denied any known sick contacts w/ COVID but as above, had been going to his [employer] gym on a daily basis; has a GF who goes out into community, but was asymptomatic. On day of adm, he visited his PCP Dr. Eliel Deal who sent pt to ER for further evaluation.   Also per earlier documentation this adm, when patient was walking to bed, he was unsteady on feet; on talking to pt in ED on adm, he does say he has a hx of falls in the past as well as unsteadiness which he has managed by being more careful with watching where he steps and taking time to get up slowly from bed.  Does not walk with assistance.  Denied any new numbness/weakness/HA.     PMH: HLD, DM, BPH  PSH: None  Meds: Documented tamsulosin, metformin, fenofibrate, pt stated he takes multivitamins and said yes when asked whether he takes medications for his prostate, DM, cholesterol, but will need formal med rec.   Allergies: NKDA  FH: None  SH: Lives in an [address] , is independent at home, goes to gym daily to exercise on elliptical and treadmill, has not smoked a cigarette since he was 17, drinks around 1 glass (usually wine) daily, no hx of drug use.    ED Course:  T 97.8, HR 96, /66, RR 18, Spo2 100% on RA  Labs showing Trop T 0.04 (later 0.01) WBC 12.93, Hb 10.6, INR 1.29, PT 15.3, PTT 23, DD 1126, CRP 21.28, Lactate 0.7, K 5.4, Creat 2.11, BUN 74, AST 60, ALT 92 (pt denied any recent tyl/NSAID use), procal 0.20, Serum BNP 1004, COVID + (was previously negative 12/11/2020).  Imaging: Xray initial read showing no acute pathology (later follow-up phone call with radiology showing lower lobe airpace opacities looking like early covid; LLL w/ small effusion as well).  Bedside TTE showing n/l BIV function, large circumferential pericardial effusion more prominent along R heart chambers, systolic collapse of RA, mild diastolic invagination of RV, 50% respiratory variation of mitral E inflow velocity, findings c/w early echocardiographic tamponade; EKG showing NSR, Rate 94, , QRS 86, diffuse elevated ST/t waves  Received NS 1L x1, then put on NS @ 125 cc/hr. Then started per CCU team on colchicine 0.6 mg QD and Lovenox 30 mg Q24       86 y/o m with PMH of DM, HLD, BPH presented to ED for CP.  Pt recently had COVID swab 12/10 which was negative.   Pt has had CP for the last 2-3 weeks, with no apparent inciting event. Described as non-radiating, sharp a/w only deep inspirations (not exertional, not positional), relieved when he takes shallower breaths, 5/10 pain scale on deep inspirations, stated that has been consistent i.e. not worsened recently. Denied any limits on exercise tolerance or any pain with exercise, stated that he is really active and goes to the gym daily where he does treadmill/eliptical work, which he did until this past weekend of Dec 18-19th. Also endorsed some nonproductive cough during this period as well, but denied any loss of taste or smell, sore throat, runny nose, or any symptoms of malaise/feeling unwell/ myalgias. Denied F/C/SOB/non-pleuritic CP, Palpitations, Abd pain, N/V/changes in bowel/bladder habits. Denied any known sick contacts w/ COVID but as above, had been going to his [employer] gym on a daily basis; has a GF who goes out into community, but was asymptomatic. On day of adm, he visited his PCP Dr. Eliel Deal who sent pt to ER for further evaluation.   Also per earlier documentation this adm, when patient was walking to bed, he was unsteady on feet; on talking to pt in ED on adm, he does say he has a hx of falls in the past as well as unsteadiness which he has managed by being more careful with watching where he steps and taking time to get up slowly from bed.  Does not walk with assistance.  Denied any new numbness/weakness/HA.     PMH: HLD, DM, BPH  PSH: None  Meds: Documented tamsulosin, metformin, fenofibrate, pt stated he takes multivitamins and said yes when asked whether he takes medications for his prostate, DM, cholesterol, but will need formal med rec.   Allergies: NKDA  FH: None  SH: Lives in [address] , is independent at home, goes to gym daily to exercise on elliptical and treadmill, has not smoked a cigarette since he was 17, drinks around 1 glass (usually wine) daily, no hx of drug use.    ED Course:  T 97.8, HR 96, /66, RR 18, Spo2 100% on RA  Labs showing Trop T 0.04 (later 0.01) WBC 12.93, Hb 10.6, INR 1.29, PT 15.3, PTT 23, DD 1126, CRP 21.28, Lactate 0.7, K 5.4, Creat 2.11, BUN 74, AST 60, ALT 92 (pt denied any recent tyl/NSAID use), procal 0.20, Serum BNP 1004, COVID + (was previously negative 12/11/2020).  Imaging: Xray initial read showing no acute pathology (later follow-up phone call with radiology showing lower lobe airpace opacities looking like early covid; LLL w/ small effusion as well).  Bedside TTE showing n/l BIV function, large circumferential pericardial effusion more prominent along R heart chambers, systolic collapse of RA, mild diastolic invagination of RV, 50% respiratory variation of mitral E inflow velocity, findings c/w early echocardiographic tamponade; EKG showing NSR, Rate 94, , QRS 86, diffuse elevated ST/t waves  Received NS 1L x1, then put on NS @ 125 cc/hr. Then started per CCU team on colchicine 0.6 mg QD (renal dosing) and Lovenox 30 mg Q24 (dosing for Creat clearance)

## 2020-12-22 NOTE — H&P ADULT - ASSESSMENT
Assessment and Plan:     Neurology:    Pulmonary:    Cardiology:    Pt with generalized weakness x 2 weeks, nonspecific chest pain, seen in ED on 12/10 with neg workup and neg cta chest - sent by Dr. Deal for further evaluation, ekg no changes, cardiac labs sent including ct head for unsteady gait, otherwise no other neuro deficits.  Will perform another covid swab.  EKG with diffuse elevation - ? pericarditis with trop elevated and INES - will do bedside echo r/o effusion.  Bedside echo with large effusion and early tamponade.  Cards fellow called to also perform echo and decide ccu vs tele.  As per cards, stable for tele.    Pt then covid positive and cards called back and will take patient to CCU given covid positive and pericarditis with effusion and early tamponade.    GI:  #Elevated LFTS    :  #BPH    Renal:    MSK:    Endocrine:  #DM    ID:    FEN: NPO, Replete lytes PRN K>4, Mg>2    PPx: Hep SQ, SCDs    Code: FULL CODE    Dispo: Patient requires continued monitoring in MICU Assessment and Plan: 86 y/o m with PMH of DM, HLD, BPH presented to ED for CP.    T 97.8, HR 96, /66, RR 18, Spo2 100% on RA  Labs showing Trop T 0.04 (later 0.01) WBC 12.93, Hb 10.6, INR 1.29, PT 15.3, PTT 23, DD 1126, CRP 21.28, Lactate 0.7, K 5.4, Creat 2.11, BUN 74, AST 60, ALT 92, procal 0.20, Serum BNP 1004, COVID + (was previously negative 12/11/2020)  Imaging: Xray showing no acute pathology.  Bedside TTE showing n/l BIV function, large circumferential pericardial effusion more prominent along R heart chambers, systolic collapse of RA, mild diastolic invagination of RV, 50% respiratory variation of mitral E inflow velocity, findings c/w early echocardiographic tamponade; EKG showing NSR, Rate 94, , QRS 86, diffuse elevated ST/t waves  Received NS 1L x1, then put on NS @ 125 cc/hr. Then started per CCU team on colchicine 0.6 mg QD and Lovenox 30 mg Q24    Cardiology:  #CP  #Pericarditis  #U/S findings w/ effusion and early ultrasonographic tamponade    #Elevated troponins  Pt with generalized weakness x 2 weeks, nonspecific chest pain, seen in ED on 12/10 with neg workup and neg cta chest - sent by Dr. Deal for further evaluation, ekg no changes, cardiac labs sent including ct head for unsteady gait, otherwise no other neuro deficits.  Will perform another covid swab.  EKG with diffuse elevation - ? pericarditis with trop elevated and INES - will do bedside echo r/o effusion.  Bedside echo with large effusion and early tamponade.  Cards fellow called to also perform echo and decide ccu vs tele.  As per cards, stable for tele.    Pt then covid positive and cards called back and will take patient to CCU given covid positive and pericarditis with effusion and early tamponade.    ID:   #COVID 19 infection    #Leukocytosis: pt's slightly elevated WBC most likely reactive     Neurology: No active issues  Pulmonary: No active issues    GI:  #Elevated LFTS    :  #BPH    Renal:  #INES    MSK: No active issues    Endocrine:  #DM    FEN: NPO, NS at 125 cc/hr. Replete lytes PRN K>4, Mg>2    PPx: Lovenox 30 mg Q24, SCDs    Code: FULL CODE    Dispo: Patient requires continued monitoring in [facility] Assessment and Plan: 86 y/o m with PMH of DM, HLD, BPH presented to ED for CP.    T 97.8, HR 96, /66, RR 18, Spo2 100% on RA  Labs showing Trop T 0.04 (later 0.01) WBC 12.93, Hb 10.6, INR 1.29, PT 15.3, PTT 23, DD 1126, CRP 21.28, Lactate 0.7, K 5.4, Creat 2.11, BUN 74, AST 60, ALT 92, procal 0.20, Serum BNP 1004, COVID + (was previously negative 12/11/2020)  Imaging: Xray showing no acute pathology.  Bedside TTE showing n/l BIV function, large circumferential pericardial effusion more prominent along R heart chambers, systolic collapse of RA, mild diastolic invagination of RV, 50% respiratory variation of mitral E inflow velocity, findings c/w early echocardiographic tamponade; EKG showing NSR, Rate 94, , QRS 86, diffuse elevated ST/t waves  Received NS 1L x1, then put on NS @ 125 cc/hr. Then started per CCU team on colchicine 0.6 mg QD and Lovenox 30 mg Q24    Cardiology:  #CP  #Pericarditis  #U/S findings w/ effusion and early ultrasonographic tamponade  -Patient presented with pleuritic CP 5/10 for 2-3 weeks that has been consistent, w/ EKG and U/S findings on adm c/f pericarditis, with U/S findings additionally c/f pericardial effusion w/ early tamponade. On adm, VSS (will address pt's oxygen requirement below) and pt looks very nontoxic and comfortable.  -In setting of pt's positive COVID test this adm as well as recent cough, most likely dx would be viral pericarditis.   -Started colchicine 0.6 mg QD, continue to monitor Spo2 and status  -Plan for pericardiocentesis tomorrow, will be NPO after MN    #Elevated troponins  -0.04 => 0.01 this adm. EKG showing mild ST elev but DIFFUSE, c/w pericarditis  -Elevated trops came down; was most likely demand ischemia    Pt with generalized weakness x 2 weeks, nonspecific chest pain, seen in ED on 12/10 with neg workup and neg cta chest - sent by Dr. Toyin for further evaluation, ekg no changes, cardiac labs sent including ct head for unsteady gait, otherwise no other neuro deficits.  Will perform another covid swab.  EKG with diffuse elevation - ? pericarditis with trop elevated and INES - will do bedside echo r/o effusion.  Bedside echo with large effusion and early tamponade.  Cards fellow called to also perform echo and decide ccu vs tele.  As per cards, stable for tele.    Pt then covid positive and cards called back and will take patient to CCU given covid positive and pericarditis with effusion and early tamponade.    ID:   #COVID 19 infection  94-95% on RA. Per guidelines, at the moment does not warrant dexamethasone/remdesivir    #Leukocytosis: pt's slightly elevated WBC most likely reactive     Neurology: No active issues  Pulmonary: No active issues    GI:  #Elevated LFTS    :  #BPH    Renal:  #INES    MSK: No active issues    Endocrine:  #DM    FEN: NPO, NS at 125 cc/hr. Replete lytes PRN K>4, Mg>2    PPx: Lovenox 30 mg Q24, SCDs    Code: FULL CODE    Dispo: Patient requires continued monitoring in [facility] Assessment and Plan:   84 y/o m with PMH of DM, HLD, BPH, presenting w/ pleuritic CP, found with pericarditis with early cardiac tamponade and positive covid test, being transferred to [facility] for monitoring.     Cardiology:  #CP  #Pericarditis    #U/S findings w/ effusion and early ultrasonographic tamponade physiology  -Patient presented with pleuritic CP 5/10 for 2-3 weeks that has been consistent, w/ EKG and U/S findings on adm c/f pericarditis, with U/S findings additionally c/f pericardial effusion w/ early tamponade physiology (see below for more detail). Serum BNP on adm 1004.   -Bedside TTE showing n/l BIV function, large circumferential pericardial effusion more prominent along R heart chambers, systolic collapse of RA, mild diastolic invagination of RV, 50% respiratory variation of mitral E inflow velocity, findings c/w early echocardiographic tamponade  -On adm, VSS and pt looks very nontoxic and comfortable. Some decreased HS on exam but otherwise no JVD appreciated, no hypotension. Lactate 0.7, no issues with forward flow  -In setting of pt's positive COVID test this adm as well as recent cough, most likely dx would be viral pericarditis.   -Started colchicine 0.6 mg QD, continue to monitor Spo2 and status  -Plan for pericardiocentesis tomorrow, will be NPO after MN    #Elevated troponins  -0.04 => 0.01 this adm. EKG showing mild ST elev but DIFFUSE, c/w pericarditis  -Elevated trops came down; was most likely demand ischemia    ID:   #COVID 19 infection  -12/22/20 on adm, COVID + (was previously negative 12/11/2020). CXR obtained on adm not significantly changed from 12/11 ER visit; at that visit, CT angio chest w/out ggo; granted, covid test negative that visit.   -When pt seen in ED by us, was 94-95% when put on RA. Per guidelines, at the moment does not warrant dexamethasone/remdesivir  -DD 1126, CRP 21.28, continue to monitor ferritin,DD,CRP  -Monitor on RA, satting 95% on RA as of 12/23 around 2:30 AM when seen in [facility] room.     #Leukocytosis: pt's slightly elevated WBC most likely from covid infection as above, continue COVID mgmt as above    Neurology: No active issues  Pulmonary: No active issues    GI:  #Elevated LFTs:  -On adm, AST 60, ALT 92  -On hx, pt denied taking NSAIDs/tylenol frequently. Possibly covid-associated transaminitis  -Continue to monitor LFTs, MELD labs    :  #BPH:  -On flomax at home, will continue  -Will additionally perform med rec in am to fully ascertain pt's medicine regimen, he says he takes around 6 pills but couldn't remember their names.     Renal:  #INES  -Creat 2.11, BUN 74  -1.03 in past visit earlier this month.   -Pt looked dry on exam, and urine lytes (eg NAYELI < 20) were consistent with pre-renal picture. S/p 1L NS in ED, and on NS at 125 cc/hr for now. Will f/u creatinine.   -Avoid nephrotoxins when possible, continue to renally dose medications     MSK: No active issues    Metabolic:  #Hyperkalemia  -Mildy hyperkalemic on adm to 5.4. No EKG findings of hyperkalemia. Likely in setting of pt's INES, will f/u next BMP, if persistently elevated or higher, will consider lokelma.     Endocrine:  #DM:   -Has hx of DM, on metformin per chart records. Will do formal med rec in AM.  -mISS for now  -f/u am A1c    Heme:  #Anemia:  -Hb 10.6, MCV 96.5.   -No signs of bleeding  -No past records of labs on file found  -F/u iron studies    #Mildly elevated INR   -Adm INR 1.29, PT 15.3, PTT 23.  -Pt also with some mildly elevated transaminitis as discussed above. Albumin n/l.   -Will monitor    FEN: NPO, NS at 125 cc/hr. Replete lytes PRN K>4, Mg>2    PPx: Lovenox 30 mg Q24    Code: FULL CODE    Dispo: Patient requires continued monitoring in [facility] Assessment and Plan:   86 y/o m with PMH of DM, HLD, BPH, presenting w/ pleuritic CP, found with pericarditis with early cardiac tamponade and positive covid test, being transferred to [facility] for monitoring.     Cardiology:  #CP  #Pericarditis    #U/S findings w/ effusion and early ultrasonographic tamponade physiology  -Patient presented with pleuritic CP 5/10 for 2-3 weeks that has been consistent, w/ EKG and U/S findings on adm c/f pericarditis, with U/S findings additionally c/f pericardial effusion w/ early tamponade physiology (see below for more detail). Serum BNP on adm 1004.   -Bedside TTE showing n/l BIV function, large circumferential pericardial effusion more prominent along R heart chambers, systolic collapse of RA, mild diastolic invagination of RV, 50% respiratory variation of mitral E inflow velocity, findings c/w early echocardiographic tamponade  -On adm, VSS and pt looks very nontoxic and comfortable. Some decreased HS on exam but otherwise no JVD appreciated, no hypotension. Lactate 0.7, no issues with forward flow  -In setting of pt's positive COVID test this adm as well as recent cough, most likely dx would be viral pericarditis.   -Started colchicine 0.6 mg QD because of renal dosing, continue to monitor Spo2 and status  -Plan for pericardiocentesis tomorrow, will be NPO after MN    #Elevated troponins  -0.04 => 0.01 this adm. EKG showing mild ST elev but DIFFUSE, c/w pericarditis  -Elevated trops came down; was most likely demand ischemia    ID:   #COVID 19 infection  -12/22/20 on adm, COVID + (was previously negative 12/11/2020). CXR obtained on adm not significantly changed from 12/11 ER visit; at that visit, CT angio chest w/out ggo; granted, covid test negative that visit.   -When pt seen in ED by us, was 94-95% when put on RA. Per guidelines, at the moment does not warrant dexamethasone/remdesivir  -DD 1126, CRP 21.28, continue to monitor ferritin,DD,CRP  -Monitor on RA, satting 95% on RA as of 12/23 around 2:30 AM when seen in [facility] room.     #Leukocytosis: pt's slightly elevated WBC most likely from covid infection as above, continue COVID mgmt as above    Neurology: No active issues  Pulmonary: No active issues    GI:  #Elevated LFTs:  -On adm, AST 60, ALT 92  -On hx, pt denied taking NSAIDs/tylenol frequently. Possibly covid-associated transaminitis  -Continue to monitor LFTs, MELD labs    :  #BPH:  -On flomax at home, will continue  -Will additionally perform med rec in am to fully ascertain pt's medicine regimen, he says he takes around 6 pills but couldn't remember their names.     Renal:  #INES  -Creat 2.11, BUN 74  -1.03 in past visit earlier this month.   -Pt looked dry on exam, and urine lytes (eg NAYELI < 20) were consistent with pre-renal picture. S/p 1L NS in ED, and on NS at 125 cc/hr for now. Will f/u creatinine.   -Avoid nephrotoxins when possible, continue to renally dose medications     MSK: No active issues    Metabolic:  #Hyperkalemia  -Mildy hyperkalemic on adm to 5.4. No EKG findings of hyperkalemia. Likely in setting of pt's INES, will f/u next BMP, if persistently elevated or higher, will consider lokelma.     Endocrine:  #DM:   -Has hx of DM, on metformin per chart records. Will do formal med rec in AM.  -mISS for now  -f/u am A1c    Heme:  #Anemia:  -Hb 10.6, MCV 96.5.   -No signs of bleeding  -No past records of labs on file found  -F/u iron studies    #Mildly elevated INR   -Adm INR 1.29, PT 15.3, PTT 23.  -Pt also with some mildly elevated transaminitis as discussed above. Albumin n/l.   -Will monitor    FEN: NPO, NS at 125 cc/hr. Replete lytes PRN K>4, Mg>2    PPx: Lovenox 30 mg Q24    Code: FULL CODE    Dispo: Patient requires continued monitoring in [facility]

## 2020-12-22 NOTE — ED ADULT NURSE NOTE - OBJECTIVE STATEMENT
Pt is an 84 y/o male A&Ox4 in NAD c/o chest pain and generalized weakness x two weeks. Pt reports being seen in ED 2x in past two weeks for same complaint. Pt noted to have unsteady gait and require assistance to ambulate, pt states "I never used a cane before but now I am so weak I might have to." Pt denies N/V/D, fever/chills, loss of taste/smell. Pt talking in clear, full sentences, respirations even and unlabored, neuro intact, EKG done and signed, pt placed on CCM, PIV placed, labs sent.

## 2020-12-22 NOTE — H&P ADULT - NSHPSOCIALHISTORY_GEN_ALL_CORE
[address] in an [address] , is independent at home, goes to gym daily to exercise on elliptical and treadmill, has not smoked a cigarette since he was 17, drinks around 1 glass (usually wine) daily, no hx of drug use.

## 2020-12-22 NOTE — ED PROVIDER NOTE - PROGRESS NOTE DETAILS
seda- cardiology fellow did bedside echo, agrees moderate effusion w/ scalping of R ventricle, no full tamponade. Patient's VSS, pending dispo for CCU to [facility] or [facility]

## 2020-12-22 NOTE — H&P ADULT - NSHPLABSRESULTS_GEN_ALL_CORE
LABS:                         10.6   12.93 >-----< 274           ( 12-22-20 @ 15:48 )             32.8       139  |  101  |   74  ----------------------< 140    (12-22-20 @ 15:49)     5.4  |  23  |  2.11    Anion Gap: 15    Ca   10.5   (12-22-20 @ 15:49)  Mg   x    (12-22-20 @ 15:49)  Phos x    (12-22-20 @ 15:49)       TP 10.5     |  AST 3.9    -------------------------     Alb x     |  ALT x               (12-22-20 @ 15:49)  -------------------------     T-bili 3.9  |  AlkPh 55    D-bili x   COAGULATION STUDIES:     aPTT  23.0 sec    (12-22-20 @ 15:48)     PT     15.3 sec    (12-22-20 @ 15:48)     INR    1.29          (12-22-20 @ 15:48)     POCT Blood Glucose.: 121 mg/dL (12-22-20 @ 21:55)    I/O SUMMARY:

## 2020-12-22 NOTE — ED PROVIDER NOTE - CLINICAL SUMMARY MEDICAL DECISION MAKING FREE TEXT BOX
Pt with generalized weakness x 2 weeks, nonspecific chest pain, seen in ED on 12/10 with neg workup and neg cta chest - sent by Dr. Deal for further evaluation, ekg no changes, cardiac labs sent including ct head for unsteady gait, otherwise no other neuro deficits.  Will perform another covid swab. Pt with generalized weakness x 2 weeks, nonspecific chest pain, seen in ED on 12/10 with neg workup and neg cta chest - sent by Dr. Deal for further evaluation, ekg no changes, cardiac labs sent including ct head for unsteady gait, otherwise no other neuro deficits.  Will perform another covid swab.  EKG with diffuse elevation - ? pericarditis with trop elevated and INES - will do bedside echo r/o effusion. Pt with generalized weakness x 2 weeks, nonspecific chest pain, seen in ED on 12/10 with neg workup and neg cta chest - sent by Dr. Deal for further evaluation, ekg no changes, cardiac labs sent including ct head for unsteady gait, otherwise no other neuro deficits.  Will perform another covid swab.  EKG with diffuse elevation - ? pericarditis with trop elevated and INES - will do bedside echo r/o effusion.  Bedside echo with large effusion and early tamponade.  Cards fellow called to also perform echo and decide ccu vs tele.  As per cards, stable for tele. Pt with generalized weakness x 2 weeks, nonspecific chest pain, seen in ED on 12/10 with neg workup and neg cta chest - sent by Dr. Deal for further evaluation, ekg no changes, cardiac labs sent including ct head for unsteady gait, otherwise no other neuro deficits.  Will perform another covid swab.  EKG with diffuse elevation - ? pericarditis with trop elevated and INES - will do bedside echo r/o effusion.  Bedside echo with large effusion and early tamponade.  Cards fellow called to also perform echo and decide ccu vs tele.  As per cards, stable for tele.    Pt then covid positive and cards called back and will take patient to CCU given covid positive and pericarditis with effusion and early tamponade.

## 2020-12-22 NOTE — H&P ADULT - NSHPPHYSICALEXAM_GEN_ALL_CORE
VITAL SIGNS:  T(C): 36.9 (12-22-20 @ 20:38), Max: 37.6 (12-22-20 @ 15:48)  T(F): 98.5 (12-22-20 @ 20:38), Max: 99.6 (12-22-20 @ 15:48)  HR: 84 (12-22-20 @ 22:15) (84 - 96)  BP: 149/77 (12-22-20 @ 22:15) (104/64 - 149/77)  BP(mean): --  RR: 18 (12-22-20 @ 22:15) (18 - 18)  SpO2: 97% (12-22-20 @ 22:15) (91% - 100%)  Wt(kg): --    PHYSICAL EXAM:  Constitutional: WDWN resting comfortably in bed; NAD  Head: NC/AT  Eyes: PERRL, EOMI, anicteric sclera  ENT: no nasal discharge; uvula midline, no oropharyngeal erythema or exudates; MMM  Neck: supple; no JVD or thyromegaly  Respiratory: CTA B/L; no W/R/R, no retractions  Cardiac: +S1/S2; RRR; no M/R/G; PMI non-displaced  Gastrointestinal: abdomen soft, NT/ND; no rebound or guarding; +BSx4  Back: spine midline, no bony tenderness or step-offs; no CVAT B/L  Extremities: WWP, no clubbing or cyanosis; no peripheral edema  Musculoskeletal: NROM x4; no joint swelling, tenderness or erythema  Vascular: 2+ radial, femoral, DP/PT pulses B/L  Dermatologic: skin warm, dry and intact; no rashes, wounds, or scars  Lymphatic: no submandibular or cervical LAD  Neurologic: AAOx3; CNII-XII grossly intact; no focal deficits  Psychiatric: affect and characteristics of appearance, verbalizations, behaviors are appropriate VITAL SIGNS:  T(C): 36.9 (12-22-20 @ 20:38), Max: 37.6 (12-22-20 @ 15:48)  T(F): 98.5 (12-22-20 @ 20:38), Max: 99.6 (12-22-20 @ 15:48)  HR: 84 (12-22-20 @ 22:15) (84 - 96)  BP: 149/77 (12-22-20 @ 22:15) (104/64 - 149/77)  BP(mean): --  RR: 18 (12-22-20 @ 22:15) (18 - 18)  SpO2: 97% (12-22-20 @ 22:15) (91% - 100%)  Wt(kg): --    PHYSICAL EXAM:  Constitutional: Looks well for stated age, resting comfortably in bed and conversant; NAD  Head: NC/AT  Eyes: PERRL, EOMI, anicteric sclera  ENT: no nasal discharge; uvula midline, no oropharyngeal erythema or exudates; MM looking dry   Neck: supple; no JVD or thyromegaly  Respiratory: Excursions more shallow, limited slightly by pleuritic CP. Trace crackles RLL, otherwise CTAB; no W/R/R, no retractions or tachypnea or signs of resp distress  Cardiac: +S1/S2; RRR; no M/R/G; PMI non-displaced. No TTP over chest wall. No chest pain on transition from laying down at 30 degrees to sitting up in bed to breathe during lung examination  Gastrointestinal: abdomen soft and full, NTTP no rebound or guarding; +BSx4  Extremities: Extremities cool, no clubbing or cyanosis; no peripheral edema  Musculoskeletal: NROM x4; no joint swelling, tenderness or erythema  Vascular: 2+ radial, femoral, DP/PT pulses B/L  Dermatologic: skin cool, dry and intact; no rashes, wounds, or scars  Lymphatic: no submandibular or cervical LAD  Neurologic: AAOx3; CNII-XII grossly intact; no focal deficits  Psychiatric: affect and characteristics of appearance, verbalizations, behaviors are appropriate VITAL SIGNS:  T(C): 36.9 (12-22-20 @ 20:38), Max: 37.6 (12-22-20 @ 15:48)  T(F): 98.5 (12-22-20 @ 20:38), Max: 99.6 (12-22-20 @ 15:48)  HR: 84 (12-22-20 @ 22:15) (84 - 96)  BP: 149/77 (12-22-20 @ 22:15) (104/64 - 149/77)  BP(mean): --  RR: 18 (12-22-20 @ 22:15) (18 - 18)  SpO2: 97% (12-22-20 @ 22:15) (91% - 100%)  Wt(kg): --    PHYSICAL EXAM:  Constitutional: Looks well for stated age, resting comfortably in bed and conversant; NAD  Head: NC/AT  Eyes: PERRL, EOMI, anicteric sclera  ENT: no nasal discharge; uvula midline, no oropharyngeal erythema or exudates; MM looking dry   Neck: supple; no JVD or thyromegaly  Respiratory: Excursions more shallow, limited slightly by pleuritic CP. Trace crackles RLL, otherwise CTAB; no W/R/R, no retractions or tachypnea or signs of resp distress  Cardiac: Some decreased heart sounds; +S1/S2; RRR; no M/R/G; PMI non-displaced. No TTP over chest wall. No chest pain on transition from laying down at 30 degrees to sitting up in bed to breathe during lung examination  Gastrointestinal: abdomen soft and full, NTTP no rebound or guarding; +BSx4  Extremities: Extremities cool, no clubbing or cyanosis; no peripheral edema  Musculoskeletal: NROM x4; no joint swelling, tenderness or erythema  Vascular: 2+ radial, femoral, DP/PT pulses B/L  Dermatologic: skin cool, dry and intact; no rashes, wounds, or scars  Lymphatic: no submandibular or cervical LAD  Neurologic: AAOx3; CNII-XII grossly intact; no focal deficits  Psychiatric: affect and characteristics of appearance, verbalizations, behaviors are appropriate

## 2020-12-23 LAB
A1C WITH ESTIMATED AVERAGE GLUCOSE RESULT: 6.5 % — HIGH (ref 4–5.6)
ALBUMIN SERPL ELPH-MCNC: 3.3 G/DL — SIGNIFICANT CHANGE UP (ref 3.3–5)
ALP SERPL-CCNC: 51 U/L — SIGNIFICANT CHANGE UP (ref 40–120)
ALT FLD-CCNC: 70 U/L — HIGH (ref 10–45)
ANION GAP SERPL CALC-SCNC: 14 MMOL/L — SIGNIFICANT CHANGE UP (ref 5–17)
AST SERPL-CCNC: 26 U/L — SIGNIFICANT CHANGE UP (ref 10–40)
BASOPHILS # BLD AUTO: 0.03 K/UL — SIGNIFICANT CHANGE UP (ref 0–0.2)
BASOPHILS NFR BLD AUTO: 0.3 % — SIGNIFICANT CHANGE UP (ref 0–2)
BILIRUB SERPL-MCNC: 0.4 MG/DL — SIGNIFICANT CHANGE UP (ref 0.2–1.2)
BUN SERPL-MCNC: 63 MG/DL — HIGH (ref 7–23)
CALCIUM SERPL-MCNC: 9.1 MG/DL — SIGNIFICANT CHANGE UP (ref 8.4–10.5)
CHLORIDE SERPL-SCNC: 108 MMOL/L — SIGNIFICANT CHANGE UP (ref 96–108)
CHOLEST SERPL-MCNC: 89 MG/DL — SIGNIFICANT CHANGE UP
CO2 SERPL-SCNC: 20 MMOL/L — LOW (ref 22–31)
CREAT ?TM UR-MCNC: 158 MG/DL — SIGNIFICANT CHANGE UP
CREAT SERPL-MCNC: 1.29 MG/DL — SIGNIFICANT CHANGE UP (ref 0.5–1.3)
CRP SERPL-MCNC: 17.94 MG/DL — HIGH (ref 0–0.4)
D DIMER BLD IA.RAPID-MCNC: 882 NG/ML DDU — HIGH
EOSINOPHIL # BLD AUTO: 0.15 K/UL — SIGNIFICANT CHANGE UP (ref 0–0.5)
EOSINOPHIL NFR BLD AUTO: 1.5 % — SIGNIFICANT CHANGE UP (ref 0–6)
ESTIMATED AVERAGE GLUCOSE: 140 MG/DL — HIGH (ref 68–114)
FERRITIN SERPL-MCNC: 370 NG/ML — SIGNIFICANT CHANGE UP (ref 30–400)
GLUCOSE BLDC GLUCOMTR-MCNC: 109 MG/DL — HIGH (ref 70–99)
GLUCOSE BLDC GLUCOMTR-MCNC: 112 MG/DL — HIGH (ref 70–99)
GLUCOSE BLDC GLUCOMTR-MCNC: 119 MG/DL — HIGH (ref 70–99)
GLUCOSE SERPL-MCNC: 127 MG/DL — HIGH (ref 70–99)
HAV IGM SER-ACNC: SIGNIFICANT CHANGE UP
HBV CORE IGM SER-ACNC: SIGNIFICANT CHANGE UP
HBV SURFACE AG SER-ACNC: SIGNIFICANT CHANGE UP
HCT VFR BLD CALC: 32.1 % — LOW (ref 39–50)
HCV AB S/CO SERPL IA: 0.1 S/CO — SIGNIFICANT CHANGE UP
HCV AB SERPL-IMP: SIGNIFICANT CHANGE UP
HDLC SERPL-MCNC: 25 MG/DL — LOW
HGB BLD-MCNC: 10.2 G/DL — LOW (ref 13–17)
IMM GRANULOCYTES NFR BLD AUTO: 0.5 % — SIGNIFICANT CHANGE UP (ref 0–1.5)
LIPID PNL WITH DIRECT LDL SERPL: 42 MG/DL — SIGNIFICANT CHANGE UP
LYMPHOCYTES # BLD AUTO: 1.25 K/UL — SIGNIFICANT CHANGE UP (ref 1–3.3)
LYMPHOCYTES # BLD AUTO: 12.3 % — LOW (ref 13–44)
MAGNESIUM SERPL-MCNC: 2.4 MG/DL — SIGNIFICANT CHANGE UP (ref 1.6–2.6)
MCHC RBC-ENTMCNC: 31.8 GM/DL — LOW (ref 32–36)
MCHC RBC-ENTMCNC: 31.8 PG — SIGNIFICANT CHANGE UP (ref 27–34)
MCV RBC AUTO: 100 FL — SIGNIFICANT CHANGE UP (ref 80–100)
MONOCYTES # BLD AUTO: 0.88 K/UL — SIGNIFICANT CHANGE UP (ref 0–0.9)
MONOCYTES NFR BLD AUTO: 8.7 % — SIGNIFICANT CHANGE UP (ref 2–14)
NEUTROPHILS # BLD AUTO: 7.8 K/UL — HIGH (ref 1.8–7.4)
NEUTROPHILS NFR BLD AUTO: 76.7 % — SIGNIFICANT CHANGE UP (ref 43–77)
NON HDL CHOLESTEROL: 64 MG/DL — SIGNIFICANT CHANGE UP
NRBC # BLD: 0 /100 WBCS — SIGNIFICANT CHANGE UP (ref 0–0)
PHOSPHATE SERPL-MCNC: 3.9 MG/DL — SIGNIFICANT CHANGE UP (ref 2.5–4.5)
PLATELET # BLD AUTO: 247 K/UL — SIGNIFICANT CHANGE UP (ref 150–400)
POTASSIUM SERPL-MCNC: 4.5 MMOL/L — SIGNIFICANT CHANGE UP (ref 3.5–5.3)
POTASSIUM SERPL-SCNC: 4.5 MMOL/L — SIGNIFICANT CHANGE UP (ref 3.5–5.3)
PROT SERPL-MCNC: 6.3 G/DL — SIGNIFICANT CHANGE UP (ref 6–8.3)
RBC # BLD: 3.21 M/UL — LOW (ref 4.2–5.8)
RBC # FLD: 13.2 % — SIGNIFICANT CHANGE UP (ref 10.3–14.5)
SODIUM SERPL-SCNC: 142 MMOL/L — SIGNIFICANT CHANGE UP (ref 135–145)
SODIUM UR-SCNC: <20 MMOL/L — SIGNIFICANT CHANGE UP
TRIGL SERPL-MCNC: 110 MG/DL — SIGNIFICANT CHANGE UP
WBC # BLD: 10.16 K/UL — SIGNIFICANT CHANGE UP (ref 3.8–10.5)
WBC # FLD AUTO: 10.16 K/UL — SIGNIFICANT CHANGE UP (ref 3.8–10.5)

## 2020-12-23 PROCEDURE — 99291 CRITICAL CARE FIRST HOUR: CPT | Mod: CS

## 2020-12-23 PROCEDURE — 93321 DOPPLER ECHO F-UP/LMTD STD: CPT | Mod: 26

## 2020-12-23 PROCEDURE — 93308 TTE F-UP OR LMTD: CPT | Mod: 26

## 2020-12-23 PROCEDURE — 71045 X-RAY EXAM CHEST 1 VIEW: CPT | Mod: 26

## 2020-12-23 RX ORDER — COLCHICINE 0.6 MG
0.6 TABLET ORAL EVERY 12 HOURS
Refills: 0 | Status: DISCONTINUED | OUTPATIENT
Start: 2020-12-23 | End: 2020-12-26

## 2020-12-23 RX ORDER — FAMOTIDINE 10 MG/ML
20 INJECTION INTRAVENOUS DAILY
Refills: 0 | Status: DISCONTINUED | OUTPATIENT
Start: 2020-12-23 | End: 2020-12-26

## 2020-12-23 RX ORDER — DEXAMETHASONE 0.5 MG/5ML
6 ELIXIR ORAL EVERY 24 HOURS
Refills: 0 | Status: DISCONTINUED | OUTPATIENT
Start: 2020-12-23 | End: 2020-12-26

## 2020-12-23 RX ORDER — ENOXAPARIN SODIUM 100 MG/ML
40 INJECTION SUBCUTANEOUS EVERY 24 HOURS
Refills: 0 | Status: DISCONTINUED | OUTPATIENT
Start: 2020-12-23 | End: 2020-12-26

## 2020-12-23 RX ORDER — CHLORHEXIDINE GLUCONATE 213 G/1000ML
1 SOLUTION TOPICAL
Refills: 0 | Status: DISCONTINUED | OUTPATIENT
Start: 2020-12-23 | End: 2020-12-26

## 2020-12-23 RX ORDER — TAMSULOSIN HYDROCHLORIDE 0.4 MG/1
0.4 CAPSULE ORAL AT BEDTIME
Refills: 0 | Status: DISCONTINUED | OUTPATIENT
Start: 2020-12-23 | End: 2020-12-26

## 2020-12-23 RX ORDER — LANOLIN ALCOHOL/MO/W.PET/CERES
3 CREAM (GRAM) TOPICAL AT BEDTIME
Refills: 0 | Status: DISCONTINUED | OUTPATIENT
Start: 2020-12-23 | End: 2020-12-26

## 2020-12-23 RX ADMIN — TAMSULOSIN HYDROCHLORIDE 0.4 MILLIGRAM(S): 0.4 CAPSULE ORAL at 21:50

## 2020-12-23 RX ADMIN — Medication 3 MILLIGRAM(S): at 02:34

## 2020-12-23 RX ADMIN — FAMOTIDINE 20 MILLIGRAM(S): 10 INJECTION INTRAVENOUS at 10:36

## 2020-12-23 RX ADMIN — CHLORHEXIDINE GLUCONATE 1 APPLICATION(S): 213 SOLUTION TOPICAL at 10:36

## 2020-12-23 RX ADMIN — SODIUM CHLORIDE 125 MILLILITER(S): 9 INJECTION INTRAMUSCULAR; INTRAVENOUS; SUBCUTANEOUS at 02:34

## 2020-12-23 RX ADMIN — Medication 6 MILLIGRAM(S): at 10:36

## 2020-12-23 RX ADMIN — ENOXAPARIN SODIUM 40 MILLIGRAM(S): 100 INJECTION SUBCUTANEOUS at 21:50

## 2020-12-23 RX ADMIN — CHLORHEXIDINE GLUCONATE 1 APPLICATION(S): 213 SOLUTION TOPICAL at 06:56

## 2020-12-23 RX ADMIN — Medication 0.6 MILLIGRAM(S): at 21:50

## 2020-12-23 RX ADMIN — Medication 3 MILLIGRAM(S): at 21:50

## 2020-12-23 RX ADMIN — Medication 0.6 MILLIGRAM(S): at 10:36

## 2020-12-23 NOTE — PROGRESS NOTE ADULT - ATTENDING COMMENTS
Continues to experience chest pain w/ deep breathes; pain is located over the sternum and radiates to the L across the chest.  86 y/o m with PMH of DM, HLD, BPH, presenting w/ pleuritic CP, found with pericarditis with early cardiac tamponade and positive covid test, being transferred to [facility] for monitoring.     Cardiology:  #CP  #Pericarditis    #U/S findings w/ effusion and early ultrasonographic tamponade physiology  -Patient presented with pleuritic CP 5/10 for 2-3 weeks that has been consistent, w/ EKG and U/S findings on adm c/f pericarditis, with U/S findings additionally c/f pericardial effusion w/ early tamponade physiology (see below for more detail). Serum BNP on adm 1004.   -Bedside TTE showing n/l BIV function, large circumferential pericardial effusion more prominent along R heart chambers, systolic collapse of RA, mild diastolic invagination of RV, 50% respiratory variation of mitral E inflow velocity, findings c/w early echocardiographic tamponade  -On adm, VSS and pt looks very nontoxic and comfortable. Some decreased HS on exam but otherwise no JVD appreciated, no hypotension. Lactate 0.7, no issues with forward flow  -Pt continues to be hemodynamically stable, no clinical signs of tamponade   -In setting of pt's positive COVID test this adm as well as recent cough, most likely dx would be viral pericarditis.   -Continue maintenance fluids NS @125 cc, D/C fluids after pericardiocentesis   -Continue colchicine 0.6 mg, increase from QD to BID given improvement of INES. Start Decadron 6mg q24h for tx of both pericarditis and COVID  -Plan for pericardiocentesis today, pt currently NPO     #Elevated troponins  -0.04 => 0.01 this adm. EKG showing mild ST elev but DIFFUSE, c/w pericarditis  -Elevated trops came down; was most likely demand ischemia    ID:   #COVID 19 infection  -12/22/20 on adm, COVID + (was previously negative 12/11/2020). CXR obtained on adm not significantly changed from 12/11 ER visit; at that visit, CT angio chest w/out ggo; granted, covid test negative that visit.   -When pt seen in ED by us, was 94-95% when put on RA. Per guidelines, at the moment does not warrant dexamethasone/remdesivir  -DD 1126, CRP 21.28, continue to monitor ferritin,DD,CRP  -Monitor on RA, satting 95% on RA   -Start Decadron 6mg q24h for tx of both pericarditis and COVID    #Leukocytosis: pt's slightly elevated WBC most likely from covid infection as above, continue COVID mgmt as above    GI:  #Elevated LFTs:  -On adm, AST 60, ALT 92, now downtending  -On hx, pt denied taking NSAIDs/tylenol frequently. Possibly covid-associated transaminitis    Renal:  #INES  -Creat 2.11, BUN 74 on admission, Creat 1.29 this morning s/p fluids  -Cr 1.03 in past visit earlier this month.   -Pt looked dry on exam, and urine lytes (eg NAYELI < 20) were consistent with pre-renal picture  -D/c fluids after pericardiocentesis       Metabolic:  #Hyperkalemia  -Mildy hyperkalemic on adm to 5.4. No EKG findings of hyperkalemia. Likely in setting of pt's INES.  -K+ wnl this morning       I have personally provided 30 minutes of clinical care time concurrently with the residents and fellow.  This is a COVID-19 positive patient so majority of care time spent reviewing chart, data and outside of the room. Fellow conducted the exam with appropriate gown, mask and gloves. This time excludes time spent on separate procedures and time spent teaching. I have reviewed the resident’s documentation and I agree with the resident’s assessment and plan of care.  CANDELARIA Ochoa

## 2020-12-23 NOTE — PROGRESS NOTE ADULT - ASSESSMENT
84 y/o m with PMH of DM, HLD, BPH, presenting w/ pleuritic CP, found with pericarditis with early cardiac tamponade and positive covid test, being transferred to [facility] for monitoring.     Cardiology:  #CP  #Pericarditis    #U/S findings w/ effusion and early ultrasonographic tamponade physiology  -Patient presented with pleuritic CP 5/10 for 2-3 weeks that has been consistent, w/ EKG and U/S findings on adm c/f pericarditis, with U/S findings additionally c/f pericardial effusion w/ early tamponade physiology (see below for more detail). Serum BNP on adm 1004.   -Bedside TTE showing n/l BIV function, large circumferential pericardial effusion more prominent along R heart chambers, systolic collapse of RA, mild diastolic invagination of RV, 50% respiratory variation of mitral E inflow velocity, findings c/w early echocardiographic tamponade  -On adm, VSS and pt looks very nontoxic and comfortable. Some decreased HS on exam but otherwise no JVD appreciated, no hypotension. Lactate 0.7, no issues with forward flow  -In setting of pt's positive COVID test this adm as well as recent cough, most likely dx would be viral pericarditis.   -Started colchicine 0.6 mg QD because of renal dosing, continue to monitor Spo2 and status  -Plan for pericardiocentesis tomorrow, will be NPO after MN    #Elevated troponins  -0.04 => 0.01 this adm. EKG showing mild ST elev but DIFFUSE, c/w pericarditis  -Elevated trops came down; was most likely demand ischemia    ID:   #COVID 19 infection  -12/22/20 on adm, COVID + (was previously negative 12/11/2020). CXR obtained on adm not significantly changed from 12/11 ER visit; at that visit, CT angio chest w/out ggo; granted, covid test negative that visit.   -When pt seen in ED by us, was 94-95% when put on RA. Per guidelines, at the moment does not warrant dexamethasone/remdesivir  -DD 1126, CRP 21.28, continue to monitor ferritin,DD,CRP  -Monitor on RA, satting 95% on RA as of 12/23 around 2:30 AM when seen in [facility] room.     #Leukocytosis: pt's slightly elevated WBC most likely from covid infection as above, continue COVID mgmt as above    Neurology: No active issues  Pulmonary: No active issues    GI:  #Elevated LFTs:  -On adm, AST 60, ALT 92  -On hx, pt denied taking NSAIDs/tylenol frequently. Possibly covid-associated transaminitis  -Continue to monitor LFTs, MELD labs    :  #BPH:  -On flomax at home, will continue  -Will additionally perform med rec in am to fully ascertain pt's medicine regimen, he says he takes around 6 pills but couldn't remember their names.     Renal:  #INES  -Creat 2.11, BUN 74  -1.03 in past visit earlier this month.   -Pt looked dry on exam, and urine lytes (eg NAYELI < 20) were consistent with pre-renal picture. S/p 1L NS in ED, and on NS at 125 cc/hr for now. Will f/u creatinine.   -Avoid nephrotoxins when possible, continue to renally dose medications     MSK: No active issues    Metabolic:  #Hyperkalemia  -Mildy hyperkalemic on adm to 5.4. No EKG findings of hyperkalemia. Likely in setting of pt's INES, will f/u next BMP, if persistently elevated or higher, will consider lokelma.     Endocrine:  #DM:   -Has hx of DM, on metformin per chart records. Will do formal med rec in AM.  -mISS for now  -f/u am A1c    Heme:  #Anemia:  -Hb 10.6, MCV 96.5.   -No signs of bleeding  -No past records of labs on file found  -F/u iron studies    #Mildly elevated INR   -Adm INR 1.29, PT 15.3, PTT 23.  -Pt also with some mildly elevated transaminitis as discussed above. Albumin n/l.   -Will monitor    FEN: NPO, NS at 125 cc/hr. Replete lytes PRN K>4, Mg>2  PPx: Lovenox 30 mg Q24  Code: FULL CODE  Dispo: Patient requires continued monitoring in [facility] 86 y/o m with PMH of DM, HLD, BPH, presenting w/ pleuritic CP, found with pericarditis with early cardiac tamponade and positive covid test, being transferred to [facility] for monitoring.     Cardiology:  #CP  #Pericarditis    #U/S findings w/ effusion and early ultrasonographic tamponade physiology  -Patient presented with pleuritic CP 5/10 for 2-3 weeks that has been consistent, w/ EKG and U/S findings on adm c/f pericarditis, with U/S findings additionally c/f pericardial effusion w/ early tamponade physiology (see below for more detail). Serum BNP on adm 1004.   -Bedside TTE showing n/l BIV function, large circumferential pericardial effusion more prominent along R heart chambers, systolic collapse of RA, mild diastolic invagination of RV, 50% respiratory variation of mitral E inflow velocity, findings c/w early echocardiographic tamponade  -On adm, VSS and pt looks very nontoxic and comfortable. Some decreased HS on exam but otherwise no JVD appreciated, no hypotension. Lactate 0.7, no issues with forward flow  -Pt continues to be hemodynamically stable, no clinical signs of tamponade   -In setting of pt's positive COVID test this adm as well as recent cough, most likely dx would be viral pericarditis.   -Continue colchicine 0.6 mg, increase from QD to BID given improvement of INES. Start Decadron 6mg q24h for tx of both pericarditis and COVID  -Plan for pericardiocentesis today, pt currently NPO     #Elevated troponins  -0.04 => 0.01 this adm. EKG showing mild ST elev but DIFFUSE, c/w pericarditis  -Elevated trops came down; was most likely demand ischemia    ID:   #COVID 19 infection  -12/22/20 on adm, COVID + (was previously negative 12/11/2020). CXR obtained on adm not significantly changed from 12/11 ER visit; at that visit, CT angio chest w/out ggo; granted, covid test negative that visit.   -When pt seen in ED by us, was 94-95% when put on RA. Per guidelines, at the moment does not warrant dexamethasone/remdesivir  -DD 1126, CRP 21.28, continue to monitor ferritin,DD,CRP  -Monitor on RA, satting 95% on RA   -Start Decadron 6mg q24h for tx of both pericarditis and COVID    #Leukocytosis: pt's slightly elevated WBC most likely from covid infection as above, continue COVID mgmt as above    Neurology: No active issues  Pulmonary: No active issues    GI:  #Elevated LFTs:  -On adm, AST 60, ALT 92, now downtending  -On hx, pt denied taking NSAIDs/tylenol frequently. Possibly covid-associated transaminitis    :  #BPH:  -On home Mirabegran, Solifenacin, Flomax  -Continue home Flomax     Renal:  #INES  -Creat 2.11, BUN 74 on admission, Creat 1.29 this morning s/p fluids  -Cr 1.03 in past visit earlier this month.   -Pt looked dry on exam, and urine lytes (eg NAYELI < 20) were consistent with pre-renal picture  -D/c fluids after pericardiocentesis     MSK: No active issues    Metabolic:  #Hyperkalemia  -Mildy hyperkalemic on adm to 5.4. No EKG findings of hyperkalemia. Likely in setting of pt's INES.  -K+ wnl this morning     Endocrine:  #DM:   -A1C on admission of 6.5  -Has hx of DM, on home metformin   -mISS for now    Heme:  #Anemia:  -Hb 10.6, MCV 96.5.   -No signs of bleeding  -No past records of labs on file found  -F/u iron studies    #Mildly elevated INR   -Adm INR 1.29, PT 15.3, PTT 23.  -Pt also with some mildly elevated transaminitis as discussed above. Albumin n/l.   -Will monitor    FEN: NPO, NS at 125 cc/hr. Replete lytes PRN K>4, Mg>2  PPx: Lovenox 40 mg Q24  GI: Pepcid 20 daily  Code: FULL CODE  Dispo: Patient requires continued monitoring in [facility] 84 y/o m with PMH of DM, HLD, BPH, presenting w/ pleuritic CP, found with pericarditis with early cardiac tamponade and positive covid test, being transferred to [facility] for monitoring.     Cardiology:  #CP  #Pericarditis    #U/S findings w/ effusion and early ultrasonographic tamponade physiology  -Patient presented with pleuritic CP 5/10 for 2-3 weeks that has been consistent, w/ EKG and U/S findings on adm c/f pericarditis, with U/S findings additionally c/f pericardial effusion w/ early tamponade physiology (see below for more detail). Serum BNP on adm 1004.   -Bedside TTE showing n/l BIV function, large circumferential pericardial effusion more prominent along R heart chambers, systolic collapse of RA, mild diastolic invagination of RV, 50% respiratory variation of mitral E inflow velocity, findings c/w early echocardiographic tamponade  -On adm, VSS and pt looks very nontoxic and comfortable. Some decreased HS on exam but otherwise no JVD appreciated, no hypotension. Lactate 0.7, no issues with forward flow  -Pt continues to be hemodynamically stable, no clinical signs of tamponade   -In setting of pt's positive COVID test this adm as well as recent cough, most likely dx would be viral pericarditis.   -Continue maintenance fluids NS @125 cc, D/C fluids after pericardiocentesis   -Continue colchicine 0.6 mg, increase from QD to BID given improvement of INES. Start Decadron 6mg q24h for tx of both pericarditis and COVID  -Plan for pericardiocentesis today, pt currently NPO     #Elevated troponins  -0.04 => 0.01 this adm. EKG showing mild ST elev but DIFFUSE, c/w pericarditis  -Elevated trops came down; was most likely demand ischemia    ID:   #COVID 19 infection  -12/22/20 on adm, COVID + (was previously negative 12/11/2020). CXR obtained on adm not significantly changed from 12/11 ER visit; at that visit, CT angio chest w/out ggo; granted, covid test negative that visit.   -When pt seen in ED by us, was 94-95% when put on RA. Per guidelines, at the moment does not warrant dexamethasone/remdesivir  -DD 1126, CRP 21.28, continue to monitor ferritin,DD,CRP  -Monitor on RA, satting 95% on RA   -Start Decadron 6mg q24h for tx of both pericarditis and COVID    #Leukocytosis: pt's slightly elevated WBC most likely from covid infection as above, continue COVID mgmt as above    Neurology: No active issues  Pulmonary: No active issues    GI:  #Elevated LFTs:  -On adm, AST 60, ALT 92, now downtending  -On hx, pt denied taking NSAIDs/tylenol frequently. Possibly covid-associated transaminitis    :  #BPH:  -On home Mirabegran, Solifenacin, Flomax  -Continue home Flomax     Renal:  #INES  -Creat 2.11, BUN 74 on admission, Creat 1.29 this morning s/p fluids  -Cr 1.03 in past visit earlier this month.   -Pt looked dry on exam, and urine lytes (eg NAYELI < 20) were consistent with pre-renal picture  -D/c fluids after pericardiocentesis     MSK: No active issues    Metabolic:  #Hyperkalemia  -Mildy hyperkalemic on adm to 5.4. No EKG findings of hyperkalemia. Likely in setting of pt's INES.  -K+ wnl this morning     Endocrine:  #DM:   -A1C on admission of 6.5  -Has hx of DM, on home metformin   -mISS for now    Heme:  #Anemia:  -Hb 10.6, MCV 96.5.   -No signs of bleeding  -No past records of labs on file found  -F/u iron studies    #Mildly elevated INR   -Adm INR 1.29, PT 15.3, PTT 23.  -Pt also with some mildly elevated transaminitis as discussed above. Albumin n/l.   -Will monitor    FEN: NPO, NS at 125 cc/hr. Replete lytes PRN K>4, Mg>2  PPx: Lovenox 40 mg Q24  GI: Pepcid 20 daily  Code: FULL CODE  Dispo: Patient requires continued monitoring in [facility]

## 2020-12-23 NOTE — DIETITIAN INITIAL EVALUATION ADULT. - OTHER CALCULATIONS
ABW used for calculations as pt between % of IBW. ABW 72.6kg, IBW 72.7kg, 100% IBW, ht 69", BMI 23.6. Nutrient needs based on [facility] standards of care for maintenance in adults, adjusted for COVID demand

## 2020-12-23 NOTE — DIETITIAN INITIAL EVALUATION ADULT. - OTHER INFO
85M with PMH of DM2, HLD, BPH presented to ED for CP.  Pt recently had COVID swab 12/10 which was negative. Pt has had CP for the last 2-3 weeks, with no apparent inciting event. Described as non-radiating, sharp a/w only deep inspirations (not exertional, not positional), relieved when he takes shallower breaths, 5/10 pain scale on deep inspirations, stated that has been consistent i.e. not worsened recently. Denied any limits on exercise tolerance or any pain with exercise, stated that he is really active and goes to the gym daily where he does treadmill/eliptical work, which he did until this past weekend of Dec 18-19th. Also per earlier documentation this adm, when patient was walking to bed, he was unsteady on feet; on talking to pt in ED on adm, he does say he has a hx of falls in the past as well as unsteadiness which he has managed by being more careful with watching where he steps and taking time to get up slowly from bed. On 12/22 tested positive for COVID, admitted to  for monitoring. Unable to conduct a face to face interview or nutrition-focused physical exam due to limited contact restrictions related to the pt's medical condition and isolation precautions. Information collected via window assessment, team report and phone. No noted n/v/d/c, chewing/ swallowing issues or pain impacting intake, skin is with abrasion, jacques score 20. NKFA or wt changes. Tolerating diet well at this time, on room air, comfortable in bed. Discussed DASH diet. Will follow per protocol.

## 2020-12-23 NOTE — PROGRESS NOTE ADULT - SUBJECTIVE AND OBJECTIVE BOX
*INCOMPLETE NOTE*  OVERNIGHT EVENTS:    SUBJECTIVE / INTERVAL HPI: Patient seen and examined at bedside.     VITAL SIGNS:  Vital Signs Last 24 Hrs  T(C): 36.8 (23 Dec 2020 05:24), Max: 37.9 (23 Dec 2020 01:00)  T(F): 98.3 (23 Dec 2020 05:24), Max: 100.2 (23 Dec 2020 01:00)  HR: 76 (23 Dec 2020 06:00) (76 - 96)  BP: 149/71 (23 Dec 2020 06:00) (104/64 - 149/77)  BP(mean): 104 (23 Dec 2020 06:00) (71 - 104)  RR: 25 (23 Dec 2020 06:00) (18 - 25)  SpO2: 90% (23 Dec 2020 06:00) (89% - 100%)    PHYSICAL EXAM:    General: Well developed, well nourished, no acute distress  HEENT: NC/AT; PERRL, anicteric sclera; MMM  Neck: supple  Cardiovascular: +S1/S2, RRR, no murmurs, rubs, gallops  Respiratory: CTA B/L; no W/R/R  Gastrointestinal: soft, NT/ND; +BSx4  Extremities: WWP; no edema, clubbing or cyanosis  Vascular: 2+ radial, DP/PT pulses B/L  Neurological: AAOx3; no focal deficits    MEDICATIONS:  MEDICATIONS  (STANDING):  chlorhexidine 4% Liquid 1 Application(s) Topical <User Schedule>  colchicine 0.6 milliGRAM(s) Oral daily  enoxaparin Injectable 30 milliGRAM(s) SubCutaneous every 24 hours  insulin lispro (ADMELOG) corrective regimen sliding scale   SubCutaneous Before meals and at bedtime  melatonin 3 milliGRAM(s) Oral at bedtime  sodium chloride 0.9%. 1000 milliLiter(s) (125 mL/Hr) IV Continuous <Continuous>  tamsulosin 0.4 milliGRAM(s) Oral at bedtime    MEDICATIONS  (PRN):      ALLERGIES:  Allergies    No Known Allergies    Intolerances    LABS:                        10.6   12.93 )-----------( 274      ( 22 Dec 2020 15:48 )             32.8     12-22    139  |  101  |  74<H>  ----------------------------<  140<H>  5.4<H>   |  23  |  2.11<H>    Ca    10.5      22 Dec 2020 15:49    TPro  7.2  /  Alb  3.9  /  TBili  0.5  /  DBili  x   /  AST  60<H>  /  ALT  92<H>  /  AlkPhos  55  12-22    PT/INR - ( 22 Dec 2020 15:48 )   PT: 15.3 sec;   INR: 1.29          PTT - ( 22 Dec 2020 15:48 )  PTT:23.0 sec    CAPILLARY BLOOD GLUCOSE      POCT Blood Glucose.: 112 mg/dL (23 Dec 2020 06:35)      RADIOLOGY & ADDITIONAL TESTS: Reviewed. OVERNIGHT EVENTS: Hemodynamically stable overnight.     SUBJECTIVE / INTERVAL HPI: Patient seen and examined at bedside. Patient reports feeling well. Continues to experience chest pain w/ deep breathes; pain is located over the sternum and radiates to the L across the chest. ROS otherwise negative.     VITAL SIGNS:  Vital Signs Last 24 Hrs  T(C): 36.8 (23 Dec 2020 05:24), Max: 37.9 (23 Dec 2020 01:00)  T(F): 98.3 (23 Dec 2020 05:24), Max: 100.2 (23 Dec 2020 01:00)  HR: 76 (23 Dec 2020 06:00) (76 - 96)  BP: 149/71 (23 Dec 2020 06:00) (104/64 - 149/77)  BP(mean): 104 (23 Dec 2020 06:00) (71 - 104)  RR: 25 (23 Dec 2020 06:00) (18 - 25)  SpO2: 90% (23 Dec 2020 06:00) (89% - 100%)    PHYSICAL EXAM:    General: Well developed, pleasant, no acute distress  HEENT: NC/AT; PERRL, anicteric sclera; MMM  Neck: Supple, no JVD  Cardiovascular: Decreased heart sounds. No chest pain on transition from laying down at 30 degrees to sitting up in bed to breathe during lung examination.   Respiratory: Comfortable on room air. Mild bibasilar crackles  Gastrointestinal: soft, NT/ND; +BSx4  Extremities: WWP; no edema, clubbing or cyanosis  Vascular: 2+ radial, DP/PT pulses B/L  Neurological: AAOx3; no focal deficits    MEDICATIONS:  MEDICATIONS  (STANDING):  chlorhexidine 4% Liquid 1 Application(s) Topical <User Schedule>  colchicine 0.6 milliGRAM(s) Oral daily  enoxaparin Injectable 30 milliGRAM(s) SubCutaneous every 24 hours  insulin lispro (ADMELOG) corrective regimen sliding scale   SubCutaneous Before meals and at bedtime  melatonin 3 milliGRAM(s) Oral at bedtime  sodium chloride 0.9%. 1000 milliLiter(s) (125 mL/Hr) IV Continuous <Continuous>  tamsulosin 0.4 milliGRAM(s) Oral at bedtime    MEDICATIONS  (PRN):    ALLERGIES:  Allergies    No Known Allergies    Intolerances    LABS:                        10.6   12.93 )-----------( 274      ( 22 Dec 2020 15:48 )             32.8     12-22    139  |  101  |  74<H>  ----------------------------<  140<H>  5.4<H>   |  23  |  2.11<H>    Ca    10.5      22 Dec 2020 15:49    TPro  7.2  /  Alb  3.9  /  TBili  0.5  /  DBili  x   /  AST  60<H>  /  ALT  92<H>  /  AlkPhos  55  12-22    PT/INR - ( 22 Dec 2020 15:48 )   PT: 15.3 sec;   INR: 1.29          PTT - ( 22 Dec 2020 15:48 )  PTT:23.0 sec    CAPILLARY BLOOD GLUCOSE    POCT Blood Glucose.: 112 mg/dL (23 Dec 2020 06:35)    RADIOLOGY & ADDITIONAL TESTS: Reviewed.

## 2020-12-24 LAB
ALBUMIN SERPL ELPH-MCNC: 3.2 G/DL — LOW (ref 3.3–5)
ALP SERPL-CCNC: 46 U/L — SIGNIFICANT CHANGE UP (ref 40–120)
ALT FLD-CCNC: SIGNIFICANT CHANGE UP (ref 10–45)
ANION GAP SERPL CALC-SCNC: 13 MMOL/L — SIGNIFICANT CHANGE UP (ref 5–17)
APTT BLD: 36.7 SEC — HIGH (ref 27.5–35.5)
AST SERPL-CCNC: SIGNIFICANT CHANGE UP (ref 10–40)
BASOPHILS # BLD AUTO: 0.02 K/UL — SIGNIFICANT CHANGE UP (ref 0–0.2)
BASOPHILS NFR BLD AUTO: 0.2 % — SIGNIFICANT CHANGE UP (ref 0–2)
BILIRUB SERPL-MCNC: 0.4 MG/DL — SIGNIFICANT CHANGE UP (ref 0.2–1.2)
BUN SERPL-MCNC: 47 MG/DL — HIGH (ref 7–23)
CALCIUM SERPL-MCNC: 9.4 MG/DL — SIGNIFICANT CHANGE UP (ref 8.4–10.5)
CHLORIDE SERPL-SCNC: 109 MMOL/L — HIGH (ref 96–108)
CO2 SERPL-SCNC: 22 MMOL/L — SIGNIFICANT CHANGE UP (ref 22–31)
CREAT SERPL-MCNC: 0.91 MG/DL — SIGNIFICANT CHANGE UP (ref 0.5–1.3)
CRP SERPL-MCNC: 17.37 MG/DL — HIGH (ref 0–0.4)
D DIMER BLD IA.RAPID-MCNC: 869 NG/ML DDU — HIGH
EOSINOPHIL # BLD AUTO: 0.07 K/UL — SIGNIFICANT CHANGE UP (ref 0–0.5)
EOSINOPHIL NFR BLD AUTO: 0.7 % — SIGNIFICANT CHANGE UP (ref 0–6)
FERRITIN SERPL-MCNC: 432 NG/ML — HIGH (ref 30–400)
GLUCOSE BLDC GLUCOMTR-MCNC: 129 MG/DL — HIGH (ref 70–99)
GLUCOSE BLDC GLUCOMTR-MCNC: 130 MG/DL — HIGH (ref 70–99)
GLUCOSE BLDC GLUCOMTR-MCNC: 158 MG/DL — HIGH (ref 70–99)
GLUCOSE BLDC GLUCOMTR-MCNC: 160 MG/DL — HIGH (ref 70–99)
GLUCOSE BLDC GLUCOMTR-MCNC: 185 MG/DL — HIGH (ref 70–99)
GLUCOSE SERPL-MCNC: 128 MG/DL — HIGH (ref 70–99)
HCT VFR BLD CALC: 33.1 % — LOW (ref 39–50)
HGB BLD-MCNC: 10.7 G/DL — LOW (ref 13–17)
IMM GRANULOCYTES NFR BLD AUTO: 0.6 % — SIGNIFICANT CHANGE UP (ref 0–1.5)
INR BLD: 1.27 — HIGH (ref 0.88–1.16)
LDH SERPL L TO P-CCNC: SIGNIFICANT CHANGE UP (ref 50–242)
LYMPHOCYTES # BLD AUTO: 0.96 K/UL — LOW (ref 1–3.3)
LYMPHOCYTES # BLD AUTO: 9 % — LOW (ref 13–44)
MAGNESIUM SERPL-MCNC: 2.5 MG/DL — SIGNIFICANT CHANGE UP (ref 1.6–2.6)
MCHC RBC-ENTMCNC: 31.4 PG — SIGNIFICANT CHANGE UP (ref 27–34)
MCHC RBC-ENTMCNC: 32.3 GM/DL — SIGNIFICANT CHANGE UP (ref 32–36)
MCV RBC AUTO: 97.1 FL — SIGNIFICANT CHANGE UP (ref 80–100)
MONOCYTES # BLD AUTO: 0.91 K/UL — HIGH (ref 0–0.9)
MONOCYTES NFR BLD AUTO: 8.5 % — SIGNIFICANT CHANGE UP (ref 2–14)
NEUTROPHILS # BLD AUTO: 8.63 K/UL — HIGH (ref 1.8–7.4)
NEUTROPHILS NFR BLD AUTO: 81 % — HIGH (ref 43–77)
NRBC # BLD: 0 /100 WBCS — SIGNIFICANT CHANGE UP (ref 0–0)
PLATELET # BLD AUTO: 276 K/UL — SIGNIFICANT CHANGE UP (ref 150–400)
POTASSIUM SERPL-MCNC: SIGNIFICANT CHANGE UP (ref 3.5–5.3)
POTASSIUM SERPL-SCNC: SIGNIFICANT CHANGE UP (ref 3.5–5.3)
PROT SERPL-MCNC: 6.5 G/DL — SIGNIFICANT CHANGE UP (ref 6–8.3)
PROTHROM AB SERPL-ACNC: 15.1 SEC — HIGH (ref 10.6–13.6)
RBC # BLD: 3.41 M/UL — LOW (ref 4.2–5.8)
RBC # FLD: 13.2 % — SIGNIFICANT CHANGE UP (ref 10.3–14.5)
SODIUM SERPL-SCNC: 144 MMOL/L — SIGNIFICANT CHANGE UP (ref 135–145)
WBC # BLD: 10.65 K/UL — HIGH (ref 3.8–10.5)
WBC # FLD AUTO: 10.65 K/UL — HIGH (ref 3.8–10.5)

## 2020-12-24 PROCEDURE — 99291 CRITICAL CARE FIRST HOUR: CPT | Mod: CS

## 2020-12-24 PROCEDURE — 71045 X-RAY EXAM CHEST 1 VIEW: CPT | Mod: 26

## 2020-12-24 RX ORDER — QUETIAPINE FUMARATE 200 MG/1
12.5 TABLET, FILM COATED ORAL AT BEDTIME
Refills: 0 | Status: DISCONTINUED | OUTPATIENT
Start: 2020-12-24 | End: 2020-12-26

## 2020-12-24 RX ORDER — LANOLIN ALCOHOL/MO/W.PET/CERES
2 CREAM (GRAM) TOPICAL AT BEDTIME
Refills: 0 | Status: DISCONTINUED | OUTPATIENT
Start: 2020-12-24 | End: 2020-12-26

## 2020-12-24 RX ADMIN — CHLORHEXIDINE GLUCONATE 1 APPLICATION(S): 213 SOLUTION TOPICAL at 06:40

## 2020-12-24 RX ADMIN — Medication 2 MILLIGRAM(S): at 02:41

## 2020-12-24 RX ADMIN — FAMOTIDINE 20 MILLIGRAM(S): 10 INJECTION INTRAVENOUS at 11:57

## 2020-12-24 RX ADMIN — TAMSULOSIN HYDROCHLORIDE 0.4 MILLIGRAM(S): 0.4 CAPSULE ORAL at 22:06

## 2020-12-24 RX ADMIN — Medication 0.6 MILLIGRAM(S): at 22:06

## 2020-12-24 RX ADMIN — Medication 2: at 01:03

## 2020-12-24 RX ADMIN — Medication 2: at 16:19

## 2020-12-24 RX ADMIN — Medication 6 MILLIGRAM(S): at 11:57

## 2020-12-24 RX ADMIN — Medication 2: at 23:53

## 2020-12-24 RX ADMIN — Medication 3 MILLIGRAM(S): at 22:06

## 2020-12-24 RX ADMIN — ENOXAPARIN SODIUM 40 MILLIGRAM(S): 100 INJECTION SUBCUTANEOUS at 22:05

## 2020-12-24 RX ADMIN — Medication 2 MILLIGRAM(S): at 22:06

## 2020-12-24 RX ADMIN — Medication 0.6 MILLIGRAM(S): at 10:28

## 2020-12-24 RX ADMIN — QUETIAPINE FUMARATE 12.5 MILLIGRAM(S): 200 TABLET, FILM COATED ORAL at 22:06

## 2020-12-24 NOTE — PROGRESS NOTE ADULT - ASSESSMENT
84 y/o m with PMH of DM, HLD, BPH, presenting w/ pleuritic CP, found with pericarditis with early cardiac tamponade and positive covid test, being transferred to [facility] for monitoring.     Cardiology:  #CP  #Pericarditis    #U/S findings w/ effusion and early ultrasonographic tamponade physiology  -Patient presented with pleuritic CP 5/10 for 2-3 weeks that has been consistent, w/ EKG and U/S findings on adm c/f pericarditis, with U/S findings additionally c/f pericardial effusion w/ early tamponade physiology (see below for more detail). Serum BNP on adm 1004.   -Bedside TTE showing n/l BIV function, large circumferential pericardial effusion more prominent along R heart chambers, systolic collapse of RA, mild diastolic invagination of RV, 50% respiratory variation of mitral E inflow velocity, findings c/w early echocardiographic tamponade  -On adm, VSS and pt looks very nontoxic and comfortable. Some decreased HS on exam but otherwise no JVD appreciated, no hypotension. Lactate 0.7, no issues with forward flow  -Pt continues to be hemodynamically stable, no clinical signs of tamponade   -In setting of pt's positive COVID test this adm as well as recent cough, most likely dx would be viral pericarditis.   -Continue maintenance fluids NS @125 cc, D/C fluids after pericardiocentesis   -Continue colchicine 0.6 mg, increase from QD to BID given improvement of INES. Start Decadron 6mg q24h for tx of both pericarditis and COVID  -Plan for pericardiocentesis today, pt currently NPO     #Elevated troponins  -0.04 => 0.01 this adm. EKG showing mild ST elev but DIFFUSE, c/w pericarditis  -Elevated trops came down; was most likely demand ischemia    ID:   #COVID 19 infection  -12/22/20 on adm, COVID + (was previously negative 12/11/2020). CXR obtained on adm not significantly changed from 12/11 ER visit; at that visit, CT angio chest w/out ggo; granted, covid test negative that visit.   -When pt seen in ED by us, was 94-95% when put on RA. Per guidelines, at the moment does not warrant dexamethasone/remdesivir  -DD 1126, CRP 21.28, continue to monitor ferritin,DD,CRP  -Monitor on RA, satting 95% on RA   -Start Decadron 6mg q24h for tx of both pericarditis and COVID    #Leukocytosis: pt's slightly elevated WBC most likely from covid infection as above, continue COVID mgmt as above    Neurology: No active issues  Pulmonary: No active issues    GI:  #Elevated LFTs:  -On adm, AST 60, ALT 92, now downtending  -On hx, pt denied taking NSAIDs/tylenol frequently. Possibly covid-associated transaminitis    :  #BPH:  -On home Mirabegran, Solifenacin, Flomax  -Continue home Flomax     Renal:  #INES  -Creat 2.11, BUN 74 on admission, Creat 1.29 this morning s/p fluids  -Cr 1.03 in past visit earlier this month.   -Pt looked dry on exam, and urine lytes (eg NAYELI < 20) were consistent with pre-renal picture  -D/c fluids after pericardiocentesis     MSK: No active issues    Metabolic:  #Hyperkalemia  -Mildy hyperkalemic on adm to 5.4. No EKG findings of hyperkalemia. Likely in setting of pt's INES.  -K+ wnl this morning     Endocrine:  #DM:   -A1C on admission of 6.5  -Has hx of DM, on home metformin   -mISS for now    Heme:  #Anemia:  -Hb 10.6, MCV 96.5.   -No signs of bleeding  -No past records of labs on file found  -F/u iron studies    #Mildly elevated INR   -Adm INR 1.29, PT 15.3, PTT 23.  -Pt also with some mildly elevated transaminitis as discussed above. Albumin n/l.   -Will monitor    FEN: NPO, NS at 125 cc/hr. Replete lytes PRN K>4, Mg>2  PPx: Lovenox 40 mg Q24  GI: Pepcid 20 daily  Code: FULL CODE  Dispo: Patient requires continued monitoring in [facility] 86 y/o m with PMH of DM, HLD, BPH, presenting w/ pleuritic CP, found with pericarditis with early cardiac tamponade and positive covid test, being transferred to [facility] for monitoring.     Cardiology:  #CP  #Pericarditis    #U/S findings w/ effusion and early ultrasonographic tamponade physiology  -Bedside TTE showing n/l BIV function, large circumferential pericardial effusion more prominent along R heart chambers, systolic collapse of RA, mild diastolic invagination of RV, 50% respiratory variation of mitral E inflow velocity, findings c/w early echocardiographic tamponade  -VSS and pt looks very nontoxic and comfortable. Some decreased HS on exam but otherwise no JVD appreciated, no hypotension. L  -In setting of pt's positive COVID test this adm as well as recent cough, most likely dx would be viral pericarditis.   -Planned for pericardiocentesis on 12/23, however it didn't happen due to scheduling circumstances   -Maintenance fluids NS @125 cc discontinued 12/23  -Continue colchicine 0.6 mg BID. Start Decadron 6mg q24h for tx of both pericarditis and COVID  -Planned for pericardiocentesis today; NPO after breakfast     #Elevated troponins  -0.04 => 0.01 this adm. EKG showing mild ST elev but DIFFUSE, c/w pericarditis  -Elevated trops came down; was most likely demand ischemia    ID:   #COVID 19 infection  -12/22/20 on adm, COVID + (was previously negative 12/11/2020). CXR obtained on adm not significantly changed from 12/11 ER visit; at that visit, CT angio chest w/out ggo; granted, COVID test negative that visit.   -Inflammatory markers elevated, continue to monitor ferritin,DD,CRP  -Monitor on RA, satting 95%  -Continue Decadron 6mg q24h for tx of both pericarditis and COVID    #Leukocytosis: pt's slightly elevated WBC most likely from COVID infection as above, continue COVID mgmt as above    Neurology: No active issues  Pulmonary: No active issues    GI:  #Elevated LFTs:  -On adm, AST 60, ALT 92, now downtrending  -On hx, pt denied taking NSAIDs/tylenol frequently. Possibly covid-associated transaminitis    :  #BPH:  -On home Mirabegran, Solifenacin, Flomax  -Continue home Flomax     Renal:  #INES  -Creat 2.11, BUN 74 on admission, Creat 0.91 this morning s/p fluids  -Pt looked dry on exam, and urine lytes (eg NAYELI < 20) were consistent with pre-renal picture  -D/c fluids on 12/23    MSK: No active issues    Metabolic:  #Hyperkalemia  -Mildy hyperkalemic on adm to 5.4. No EKG findings of hyperkalemia. Likely in setting of pt's INES.  -K+ wnl this morning     Endocrine:  #DM:   -A1C on admission of 6.5  -Has hx of DM, on home metformin   -mISS for now    Heme:  #Anemia:  -Hb 10.7, MCV 97.1.   -No signs of bleeding  -No past records of labs on file found    #Mildly elevated INR   -Adm INR 1.29, PT 15.3, PTT 23.  -Pt also with some mildly elevated transaminitis as discussed above. Albumin n/l.   -Will monitor    FEN: NPO after breakfast. Replete lytes PRN K>4, Mg>2  PPx: Lovenox 40 mg Q24  GI: Pepcid 20 daily  Code: FULL CODE  Dispo: Patient requires continued monitoring in [facility]

## 2020-12-24 NOTE — PROGRESS NOTE ADULT - ATTENDING COMMENTS
84 y/o m with PMH of DM, HLD, BPH, presenting w/ pleuritic CP, found with pericarditis with early cardiac tamponade and positive covid test.    Cardiology:  #CP  #Pericarditis    #U/S findings w/ effusion and early ultrasonographic tamponade physiology  -Bedside TTE showing n/l BIV function, large circumferential pericardial effusion more prominent along R heart chambers, systolic collapse of RA, mild diastolic invagination of RV, 50% respiratory variation of mitral E inflow velocity, findings c/w early echocardiographic tamponade  -VSS and pt looks very nontoxic and comfortable. Some decreased HS on exam but otherwise no JVD appreciated, no hypotension. L  -In setting of pt's positive COVID test this adm as well as recent cough, most likely dx would be viral pericarditis.   -Continue colchicine 0.6 mg BID. Continue  Decadron 6mg q24h for tx of both pericarditis and COVID  --Tentative Plan for pericardiocentesis ; NPO after breakfast     #Elevated troponins  -0.04 => 0.01 this adm. EKG showing mild ST elev but DIFFUSE, c/w pericarditis  -Elevated trops came down; was most likely demand ischemia    ID:   #COVID 19 infection  -12/22/20 on adm, COVID + (was previously negative 12/11/2020). CXR obtained on adm not significantly changed from 12/11 ER visit; at that visit, CT angio chest w/out ggo; granted, COVID test negative that visit.   -Inflammatory markers elevated, continue to monitor ferritin,DD,CRP  -Monitor on RA, satting 95%  -Continue Decadron 6mg q24h for tx of both pericarditis and COVID    #Leukocytosis: pt's slightly elevated WBC most likely from COVID infection as above, continue COVID mgmt as above    GI:  #Elevated LFTs:  -On adm, AST 60, ALT 92, now downtrending  -On hx, pt denied taking NSAIDs/tylenol frequently. Possibly covid-associated transaminitis    :  #BPH:  -On home Mirabegran, Solifenacin, Flomax  -Continue home Flomax     Renal:  #INES  -Creat 2.11, BUN 74 on admission, Creat 0.91 this morning s/p fluids  -Pt looked dry on exam, and urine lytes (eg NAYELI < 20) were consistent with pre-renal picture  -D/c fluids on 12/23      I have personally provided 30 minutes of clinical care time concurrently with the residents and fellow.  This is a COVID-19 positive patient so majority of care time spent reviewing chart, data and outside of the room. Fellow conducted the exam with appropriate gown, mask and gloves. This time excludes time spent on separate procedures and time spent teaching. I have reviewed the resident’s documentation and I agree with the resident’s assessment and plan of care.  CANDELARIA Ochoa

## 2020-12-24 NOTE — PROGRESS NOTE ADULT - SUBJECTIVE AND OBJECTIVE BOX
**INCOMPLETE NOTE**  **INCOMPLETE NOTE**    OVERNIGHT EVENTS: Patient was disoriented overnight w/ mild agitation     SUBJECTIVE / INTERVAL HPI: Patient seen and examined at bedside.     VITAL SIGNS:  Vital Signs Last 24 Hrs  T(C): 36.8 (23 Dec 2020 22:11), Max: 37.2 (23 Dec 2020 12:52)  T(F): 98.3 (23 Dec 2020 22:11), Max: 98.9 (23 Dec 2020 12:52)  HR: 71 (24 Dec 2020 05:00) (71 - 86)  BP: 157/76 (24 Dec 2020 04:00) (112/74 - 195/92)  BP(mean): 106 (24 Dec 2020 04:00) (82 - 131)  RR: 21 (24 Dec 2020 04:00) (18 - 31)  SpO2: 90% (24 Dec 2020 04:00) (89% - 93%)    PHYSICAL EXAM:    General: Well developed, well nourished, no acute distress  HEENT: NC/AT; PERRL, anicteric sclera; MMM  Neck: supple  Cardiovascular: +S1/S2, RRR, no murmurs, rubs, gallops  Respiratory: CTA B/L; no W/R/R  Gastrointestinal: soft, NT/ND; +BSx4  Extremities: WWP; no edema, clubbing or cyanosis  Vascular: 2+ radial, DP/PT pulses B/L  Neurological: AAOx3; no focal deficits    MEDICATIONS:  MEDICATIONS  (STANDING):  chlorhexidine 2% Cloths 1 Application(s) Topical <User Schedule>  colchicine 0.6 milliGRAM(s) Oral every 12 hours  dexAMETHasone     Tablet 6 milliGRAM(s) Oral every 24 hours  enoxaparin Injectable 40 milliGRAM(s) SubCutaneous every 24 hours  famotidine    Tablet 20 milliGRAM(s) Oral daily  insulin lispro (ADMELOG) corrective regimen sliding scale   SubCutaneous Before meals and at bedtime  melatonin 3 milliGRAM(s) Oral at bedtime  melatonin 2 milliGRAM(s) Oral at bedtime  tamsulosin 0.4 milliGRAM(s) Oral at bedtime    MEDICATIONS  (PRN):    ALLERGIES:  Allergies    No Known Allergies    Intolerances    LABS:                        10.2   10.16 )-----------( 247      ( 23 Dec 2020 06:54 )             32.1     12-23    142  |  108  |  63<H>  ----------------------------<  127<H>  4.5   |  20<L>  |  1.29    Ca    9.1      23 Dec 2020 06:54  Phos  3.9     12-23  Mg     2.4     12-23    TPro  6.3  /  Alb  3.3  /  TBili  0.4  /  DBili  x   /  AST  26  /  ALT  70<H>  /  AlkPhos  51  12-23    PT/INR - ( 22 Dec 2020 15:48 )   PT: 15.3 sec;   INR: 1.29          PTT - ( 22 Dec 2020 15:48 )  PTT:23.0 sec    CAPILLARY BLOOD GLUCOSE      POCT Blood Glucose.: 160 mg/dL (24 Dec 2020 00:05)      RADIOLOGY & ADDITIONAL TESTS: Reviewed. OVERNIGHT EVENTS: Patient was disoriented overnight w/ mild agitation. Remained hemodynamically stable.     SUBJECTIVE / INTERVAL HPI: Patient seen and examined at bedside. Patient reports feeling well this morning. Continues to have pleuritic chest pain, unchanged quality or severity. Denies headache, dizziness, SOB, palpitations.     VITAL SIGNS:  Vital Signs Last 24 Hrs  T(C): 36.8 (23 Dec 2020 22:11), Max: 37.2 (23 Dec 2020 12:52)  T(F): 98.3 (23 Dec 2020 22:11), Max: 98.9 (23 Dec 2020 12:52)  HR: 71 (24 Dec 2020 05:00) (71 - 86)  BP: 157/76 (24 Dec 2020 04:00) (112/74 - 195/92)  BP(mean): 106 (24 Dec 2020 04:00) (82 - 131)  RR: 21 (24 Dec 2020 04:00) (18 - 31)  SpO2: 90% (24 Dec 2020 04:00) (89% - 93%)    PHYSICAL EXAM:  General: Well developed, pleasant, no acute distress  HEENT: NC/AT; PERRL, anicteric sclera; MMM  Neck: Supple, no JVD  Cardiovascular: Decreased heart sounds. No chest pain on transition from laying down at 30 degrees to sitting up in bed to breathe during lung examination.   Respiratory: Comfortable on room air. Mild bibasilar crackles  Gastrointestinal: soft, NT/ND; +BSx4  Extremities: WWP; no edema, clubbing or cyanosis  Vascular: 2+ radial, DP/PT pulses B/L  Neurological: AAOx3; no focal deficits    MEDICATIONS:  MEDICATIONS  (STANDING):  chlorhexidine 2% Cloths 1 Application(s) Topical <User Schedule>  colchicine 0.6 milliGRAM(s) Oral every 12 hours  dexAMETHasone     Tablet 6 milliGRAM(s) Oral every 24 hours  enoxaparin Injectable 40 milliGRAM(s) SubCutaneous every 24 hours  famotidine    Tablet 20 milliGRAM(s) Oral daily  insulin lispro (ADMELOG) corrective regimen sliding scale   SubCutaneous Before meals and at bedtime  melatonin 3 milliGRAM(s) Oral at bedtime  melatonin 2 milliGRAM(s) Oral at bedtime  tamsulosin 0.4 milliGRAM(s) Oral at bedtime    MEDICATIONS  (PRN):    ALLERGIES:  Allergies    No Known Allergies    Intolerances    LABS:                        10.2   10.16 )-----------( 247      ( 23 Dec 2020 06:54 )             32.1     12-23    142  |  108  |  63<H>  ----------------------------<  127<H>  4.5   |  20<L>  |  1.29    Ca    9.1      23 Dec 2020 06:54  Phos  3.9     12-23  Mg     2.4     12-23    TPro  6.3  /  Alb  3.3  /  TBili  0.4  /  DBili  x   /  AST  26  /  ALT  70<H>  /  AlkPhos  51  12-23    PT/INR - ( 22 Dec 2020 15:48 )   PT: 15.3 sec;   INR: 1.29          PTT - ( 22 Dec 2020 15:48 )  PTT:23.0 sec    CAPILLARY BLOOD GLUCOSE      POCT Blood Glucose.: 160 mg/dL (24 Dec 2020 00:05)      RADIOLOGY & ADDITIONAL TESTS: Reviewed.

## 2020-12-25 ENCOUNTER — TRANSCRIPTION ENCOUNTER (OUTPATIENT)
Age: 85
End: 2020-12-25

## 2020-12-25 LAB
ALBUMIN SERPL ELPH-MCNC: 3.2 G/DL — LOW (ref 3.3–5)
ALP SERPL-CCNC: 50 U/L — SIGNIFICANT CHANGE UP (ref 40–120)
ALT FLD-CCNC: 89 U/L — HIGH (ref 10–45)
ANION GAP SERPL CALC-SCNC: 12 MMOL/L — SIGNIFICANT CHANGE UP (ref 5–17)
AST SERPL-CCNC: 47 U/L — HIGH (ref 10–40)
BASOPHILS # BLD AUTO: 0.01 K/UL — SIGNIFICANT CHANGE UP (ref 0–0.2)
BASOPHILS NFR BLD AUTO: 0.1 % — SIGNIFICANT CHANGE UP (ref 0–2)
BILIRUB SERPL-MCNC: 0.3 MG/DL — SIGNIFICANT CHANGE UP (ref 0.2–1.2)
BUN SERPL-MCNC: 42 MG/DL — HIGH (ref 7–23)
CALCIUM SERPL-MCNC: 9.2 MG/DL — SIGNIFICANT CHANGE UP (ref 8.4–10.5)
CHLORIDE SERPL-SCNC: 108 MMOL/L — SIGNIFICANT CHANGE UP (ref 96–108)
CO2 SERPL-SCNC: 24 MMOL/L — SIGNIFICANT CHANGE UP (ref 22–31)
CREAT SERPL-MCNC: 0.88 MG/DL — SIGNIFICANT CHANGE UP (ref 0.5–1.3)
CRP SERPL-MCNC: 10.88 MG/DL — HIGH (ref 0–0.4)
D DIMER BLD IA.RAPID-MCNC: 1289 NG/ML DDU — HIGH
EOSINOPHIL # BLD AUTO: 0.04 K/UL — SIGNIFICANT CHANGE UP (ref 0–0.5)
EOSINOPHIL NFR BLD AUTO: 0.5 % — SIGNIFICANT CHANGE UP (ref 0–6)
FERRITIN SERPL-MCNC: 354 NG/ML — SIGNIFICANT CHANGE UP (ref 30–400)
GLUCOSE BLDC GLUCOMTR-MCNC: 148 MG/DL — HIGH (ref 70–99)
GLUCOSE BLDC GLUCOMTR-MCNC: 157 MG/DL — HIGH (ref 70–99)
GLUCOSE BLDC GLUCOMTR-MCNC: 175 MG/DL — HIGH (ref 70–99)
GLUCOSE SERPL-MCNC: 126 MG/DL — HIGH (ref 70–99)
HCT VFR BLD CALC: 31.2 % — LOW (ref 39–50)
HGB BLD-MCNC: 10.2 G/DL — LOW (ref 13–17)
IMM GRANULOCYTES NFR BLD AUTO: 0.5 % — SIGNIFICANT CHANGE UP (ref 0–1.5)
LYMPHOCYTES # BLD AUTO: 0.75 K/UL — LOW (ref 1–3.3)
LYMPHOCYTES # BLD AUTO: 8.5 % — LOW (ref 13–44)
MAGNESIUM SERPL-MCNC: 2.2 MG/DL — SIGNIFICANT CHANGE UP (ref 1.6–2.6)
MCHC RBC-ENTMCNC: 31.5 PG — SIGNIFICANT CHANGE UP (ref 27–34)
MCHC RBC-ENTMCNC: 32.7 GM/DL — SIGNIFICANT CHANGE UP (ref 32–36)
MCV RBC AUTO: 96.3 FL — SIGNIFICANT CHANGE UP (ref 80–100)
MONOCYTES # BLD AUTO: 0.69 K/UL — SIGNIFICANT CHANGE UP (ref 0–0.9)
MONOCYTES NFR BLD AUTO: 7.8 % — SIGNIFICANT CHANGE UP (ref 2–14)
NEUTROPHILS # BLD AUTO: 7.26 K/UL — SIGNIFICANT CHANGE UP (ref 1.8–7.4)
NEUTROPHILS NFR BLD AUTO: 82.6 % — HIGH (ref 43–77)
NRBC # BLD: 0 /100 WBCS — SIGNIFICANT CHANGE UP (ref 0–0)
PHOSPHATE SERPL-MCNC: 2.9 MG/DL — SIGNIFICANT CHANGE UP (ref 2.5–4.5)
PLATELET # BLD AUTO: 319 K/UL — SIGNIFICANT CHANGE UP (ref 150–400)
POTASSIUM SERPL-MCNC: 4.4 MMOL/L — SIGNIFICANT CHANGE UP (ref 3.5–5.3)
POTASSIUM SERPL-SCNC: 4.4 MMOL/L — SIGNIFICANT CHANGE UP (ref 3.5–5.3)
PROT SERPL-MCNC: 6.4 G/DL — SIGNIFICANT CHANGE UP (ref 6–8.3)
RBC # BLD: 3.24 M/UL — LOW (ref 4.2–5.8)
RBC # FLD: 12.6 % — SIGNIFICANT CHANGE UP (ref 10.3–14.5)
SODIUM SERPL-SCNC: 144 MMOL/L — SIGNIFICANT CHANGE UP (ref 135–145)
WBC # BLD: 8.79 K/UL — SIGNIFICANT CHANGE UP (ref 3.8–10.5)
WBC # FLD AUTO: 8.79 K/UL — SIGNIFICANT CHANGE UP (ref 3.8–10.5)

## 2020-12-25 PROCEDURE — 71045 X-RAY EXAM CHEST 1 VIEW: CPT | Mod: 26

## 2020-12-25 PROCEDURE — 93308 TTE F-UP OR LMTD: CPT | Mod: 26

## 2020-12-25 PROCEDURE — 93321 DOPPLER ECHO F-UP/LMTD STD: CPT | Mod: 26

## 2020-12-25 PROCEDURE — 99291 CRITICAL CARE FIRST HOUR: CPT | Mod: CS

## 2020-12-25 RX ADMIN — Medication 2: at 22:09

## 2020-12-25 RX ADMIN — CHLORHEXIDINE GLUCONATE 1 APPLICATION(S): 213 SOLUTION TOPICAL at 06:20

## 2020-12-25 RX ADMIN — Medication 0.6 MILLIGRAM(S): at 22:09

## 2020-12-25 RX ADMIN — Medication 3 MILLIGRAM(S): at 22:08

## 2020-12-25 RX ADMIN — TAMSULOSIN HYDROCHLORIDE 0.4 MILLIGRAM(S): 0.4 CAPSULE ORAL at 22:08

## 2020-12-25 RX ADMIN — QUETIAPINE FUMARATE 12.5 MILLIGRAM(S): 200 TABLET, FILM COATED ORAL at 22:08

## 2020-12-25 RX ADMIN — Medication 0.6 MILLIGRAM(S): at 09:52

## 2020-12-25 RX ADMIN — Medication 6 MILLIGRAM(S): at 12:46

## 2020-12-25 RX ADMIN — ENOXAPARIN SODIUM 40 MILLIGRAM(S): 100 INJECTION SUBCUTANEOUS at 22:08

## 2020-12-25 RX ADMIN — Medication 2 MILLIGRAM(S): at 22:09

## 2020-12-25 RX ADMIN — FAMOTIDINE 20 MILLIGRAM(S): 10 INJECTION INTRAVENOUS at 12:46

## 2020-12-25 RX ADMIN — Medication 2: at 18:06

## 2020-12-25 NOTE — PROGRESS NOTE ADULT - ATTENDING COMMENTS
84 y/o m with PMH of DM, HLD, BPH, presenting w/ pleuritic CP, found with pericarditis and large pericardial effusion with borderline signs of  cardiac tamponade, positive COVID test, being transferred to [facility] for monitoring.     Cardiology:  #CP  #Pericarditis    #U/S findings w/ effusion and early ultrasonographic tamponade physiology  -On admission, Bedside TTE showing n/l BIV function, large circumferential pericardial effusion more prominent along R heart chambers, systolic collapse of RA, mild diastolic invagination of RV, 50% respiratory variation of mitral E inflow velocity, findings c/w early echocardiographic tamponade  -VSS and pt looks very nontoxic and comfortable. Some decreased HS on exam but otherwise no JVD appreciated, no hypotension.   -In setting of pt's positive COVID test this adm as well as recent cough, most likely dx would be viral pericarditis.   -Planned for pericardiocentesis on 12/23 and 12/24, however it didn't happen due to scheduling circumstances   -Continue colchicine 0.6 mg BID. Start Decadron 6mg q24h for tx of both pericarditis and COVID  -Tentative plan for pericardiocentesis today vs tomorrow (12/26); NPO when certain   -Will perform limited TTE today to monitor the effusion     #Elevated troponins  -0.04 => 0.01 on admission. EKG showing mild ST elev but DIFFUSE, c/w pericarditis  -Elevated trops came down; was most likely demand ischemia    ID:   #COVID 19 infection  -12/22/20 on adm, COVID + (was previously negative 12/11/2020). CXR obtained on adm not significantly changed from 12/11 ER visit; at that visit, CT angio chest w/out ggo; granted, COVID test negative that visit.   -Inflammatory markers elevated in setting of COVID and pericarditis, downtrending   -Monitor on RA, satting 95%  -Continue Decadron 6mg q24h for tx of both pericarditis and COVID      :  #BPH:  -On home Mirabegran, Solifenacin, Flomax  -Continue home Flomax     Renal:  #INES  -Creat 2.11, BUN 74 on admission, Pt looked dry on exam, and urine lytes (eg NAYELI < 20) were consistent with pre-renal picture  -Creat WNL s/p fluids  -D/c fluids on 12/23      I have personally provided 30 minutes of clinical care time concurrently with the residents and fellow.  This is a COVID-19 positive patient so majority of care time spent reviewing chart, data and outside of the room. Fellow conducted the exam with appropriate gown, mask and gloves. This time excludes time spent on separate procedures and time spent teaching. I have reviewed the resident’s documentation and I agree with the resident’s assessment and plan of care.  CANDELARIA Ochoa

## 2020-12-25 NOTE — DISCHARGE NOTE PROVIDER - NSDCHC_MEDRECSTATUS_GEN_ALL_CORE
Admission Reconciliation is Not Complete  Discharge Reconciliation is Not Complete change diet to cons CHO(evening snack), DASH/TLC Admission Reconciliation is Completed  Discharge Reconciliation is Completed

## 2020-12-25 NOTE — PROGRESS NOTE ADULT - SUBJECTIVE AND OBJECTIVE BOX
*INCOMPLETE NOTE*  OVERNIGHT EVENTS: Patient remained hemodynamically stable overnight.     SUBJECTIVE / INTERVAL HPI: Patient seen and examined at bedside. Pleuritic chest pain is much improved. ROS otherwise negative.     VITAL SIGNS:  Vital Signs Last 24 Hrs  T(C): 36.6 (25 Dec 2020 03:54), Max: 36.8 (24 Dec 2020 14:00)  T(F): 97.8 (25 Dec 2020 03:54), Max: 98.2 (24 Dec 2020 14:00)  HR: 63 (25 Dec 2020 04:00) (59 - 80)  BP: 145/70 (25 Dec 2020 04:00) (123/71 - 168/72)  BP(mean): 100 (25 Dec 2020 04:00) (1 - 114)  RR: 22 (25 Dec 2020 04:00) (17 - 40)  SpO2: 94% (25 Dec 2020 04:00) (89% - 97%)    PHYSICAL EXAM:  General: Well developed, pleasant, no acute distress  HEENT: NC/AT; PERRL, anicteric sclera; MMM  Neck: Supple, no JVD  Cardiovascular: RRR; no murmurs, rubs, or gallops. No chest pain on transition from laying down at 30 degrees to sitting up in bed to breathe during lung examination.   Respiratory: Comfortable on room air. CTA bilaterally   Gastrointestinal: Soft, NT/ND; +BSx4  Extremities: WWP; no edema, clubbing or cyanosis  Vascular: 2+ radial, DP/PT pulses B/L  Neurological: AAOx3; no focal deficits    MEDICATIONS:  MEDICATIONS  (STANDING):  chlorhexidine 2% Cloths 1 Application(s) Topical <User Schedule>  colchicine 0.6 milliGRAM(s) Oral every 12 hours  dexAMETHasone     Tablet 6 milliGRAM(s) Oral every 24 hours  enoxaparin Injectable 40 milliGRAM(s) SubCutaneous every 24 hours  famotidine    Tablet 20 milliGRAM(s) Oral daily  insulin lispro (ADMELOG) corrective regimen sliding scale   SubCutaneous Before meals and at bedtime  melatonin 3 milliGRAM(s) Oral at bedtime  melatonin 2 milliGRAM(s) Oral at bedtime  QUEtiapine 12.5 milliGRAM(s) Oral at bedtime  tamsulosin 0.4 milliGRAM(s) Oral at bedtime    MEDICATIONS  (PRN):    ALLERGIES:  Allergies    No Known Allergies    Intolerances    LABS:                        10.2   8.79  )-----------( 319      ( 25 Dec 2020 05:13 )             31.2     12-25    144  |  108  |  42<H>  ----------------------------<  126<H>  4.4   |  24  |  0.88    Ca    9.2      25 Dec 2020 05:13  Phos  2.9     12-25  Mg     2.2     12-25    TPro  6.4  /  Alb  3.2<L>  /  TBili  0.3  /  DBili  x   /  AST  47<H>  /  ALT  89<H>  /  AlkPhos  50  12-25    PT/INR - ( 24 Dec 2020 07:03 )   PT: 15.1 sec;   INR: 1.27       PTT - ( 24 Dec 2020 07:03 )  PTT:36.7 sec    CAPILLARY BLOOD GLUCOSE      POCT Blood Glucose.: 185 mg/dL (24 Dec 2020 21:16)      RADIOLOGY & ADDITIONAL TESTS: Reviewed.   OVERNIGHT EVENTS: Patient remained hemodynamically stable overnight.     SUBJECTIVE / INTERVAL HPI: Patient seen and examined at bedside. Pleuritic chest pain is much improved. ROS otherwise negative.     VITAL SIGNS:  Vital Signs Last 24 Hrs  T(C): 36.6 (25 Dec 2020 03:54), Max: 36.8 (24 Dec 2020 14:00)  T(F): 97.8 (25 Dec 2020 03:54), Max: 98.2 (24 Dec 2020 14:00)  HR: 63 (25 Dec 2020 04:00) (59 - 80)  BP: 145/70 (25 Dec 2020 04:00) (123/71 - 168/72)  BP(mean): 100 (25 Dec 2020 04:00) (1 - 114)  RR: 22 (25 Dec 2020 04:00) (17 - 40)  SpO2: 94% (25 Dec 2020 04:00) (89% - 97%)    PHYSICAL EXAM:  General: Well developed, pleasant, no acute distress  HEENT: NC/AT; PERRL, anicteric sclera; MMM  Neck: Supple, no JVD  Cardiovascular: Faint heart sounds. No chest pain on transition from laying down at 30 degrees to sitting up in bed to breathe during lung examination.   Respiratory: Comfortable on room air. CTA bilaterally   Gastrointestinal: Soft, NT/ND; +BSx4  Extremities: WWP; no edema, clubbing or cyanosis  Vascular: 2+ radial, DP/PT pulses B/L  Neurological: AAOx3; no focal deficits    MEDICATIONS:  MEDICATIONS  (STANDING):  chlorhexidine 2% Cloths 1 Application(s) Topical <User Schedule>  colchicine 0.6 milliGRAM(s) Oral every 12 hours  dexAMETHasone     Tablet 6 milliGRAM(s) Oral every 24 hours  enoxaparin Injectable 40 milliGRAM(s) SubCutaneous every 24 hours  famotidine    Tablet 20 milliGRAM(s) Oral daily  insulin lispro (ADMELOG) corrective regimen sliding scale   SubCutaneous Before meals and at bedtime  melatonin 3 milliGRAM(s) Oral at bedtime  melatonin 2 milliGRAM(s) Oral at bedtime  QUEtiapine 12.5 milliGRAM(s) Oral at bedtime  tamsulosin 0.4 milliGRAM(s) Oral at bedtime    MEDICATIONS  (PRN):    ALLERGIES:  Allergies    No Known Allergies    Intolerances    LABS:                        10.2   8.79  )-----------( 319      ( 25 Dec 2020 05:13 )             31.2     12-25    144  |  108  |  42<H>  ----------------------------<  126<H>  4.4   |  24  |  0.88    Ca    9.2      25 Dec 2020 05:13  Phos  2.9     12-25  Mg     2.2     12-25    TPro  6.4  /  Alb  3.2<L>  /  TBili  0.3  /  DBili  x   /  AST  47<H>  /  ALT  89<H>  /  AlkPhos  50  12-25    PT/INR - ( 24 Dec 2020 07:03 )   PT: 15.1 sec;   INR: 1.27       PTT - ( 24 Dec 2020 07:03 )  PTT:36.7 sec    CAPILLARY BLOOD GLUCOSE      POCT Blood Glucose.: 185 mg/dL (24 Dec 2020 21:16)      RADIOLOGY & ADDITIONAL TESTS: Reviewed.

## 2020-12-25 NOTE — DISCHARGE NOTE PROVIDER - HOSPITAL COURSE
#Discharge: do not delete    Patient is 84 y/o M with past medical history of DMT2, HLD, BPH, presenting w/ pleuritic chest pain, found with pericarditis with early cardiac tamponade signs on echo and positive COVID-19 test.    Problem List/Main Diagnoses (system-based):   #CP  #Pericarditis    #U/S findings w/ effusion and early ultrasonographic tamponade physiology  -On admission, Bedside TTE showing n/l BIV function, large circumferential pericardial effusion more prominent along R heart chambers, systolic collapse of RA, mild diastolic invagination of RV, 50% respiratory variation of mitral E inflow velocity, findings c/w early echocardiographic tamponade  -VSS and pt looks very nontoxic and comfortable. Some decreased HS on exam but otherwise no JVD appreciated, no hypotension.   -In setting of pt's positive COVID test this adm as well as recent cough, most likely dx would be viral pericarditis.   -Patient remained hemodynamically stable, and plan for pericardiocentesis was put on hold   -Started pm colchicine 0.6 mg BID and Decadron 6mg q24h for tx of both pericarditis and COVID  -Repeat bedside echo done on 12/25 to monitor the effusion, showing complete resolution of the effusion.   -Discharge home with 3 month course of Colchicine and Prednisone taper     #COVID 19 infection  -12/22/20 on adm, COVID + (was previously negative 12/11/2020). CXR obtained on adm not significantly changed from 12/11 ER visit; at that visit, CT angio chest w/out ggo; granted, COVID test negative that visit.   -Inflammatory markers elevated in setting of COVID and pericarditis, downtrending   -Remained on room air throughout hospital stay, with O2 saturations >94%  -Continue Decadron 6mg q24h for tx of both pericarditis and COVID    #Elevated LFTs:  -Possibly covid-associated transaminitis  -Continue to monitor     #INES  -Creat 2.11, BUN 74 on admission, Pt looked dry on exam, and urine lytes (eg NAYELI < 20) were consistent with pre-renal picture  -Creat WNL s/p fluids  -D/c fluids on 12/23    #DM:   -A1C on admission of 6.5  -Has hx of DM, on home metformin   -mISS for now    Inpatient treatment course: 84 y/o M with PMH of DM, HLD, BPH, presenting to the ED with pleuritic chest pain. Pt recently had COVID swab 12/10 which was negative. CP was present for the last 2-3 weeks, described pleuritic in nature. CXR in ED showing lower lobe airspace opacities consistent with early COVID, and with small effusion. Bedside TTE showing n/l BIV function, large circumferential pericardial effusion more prominent along R heart chambers, systolic collapse of RA, mild diastolic invagination of RV, 50% respiratory variation of mitral E inflow velocity, findings c/w early echocardiographic tamponade. EKG showing NSR with diffuse elevated ST/t waves. Patient had no clinical signs of cardiac tamponade; vitals were stable and no JVD appreciated on physical exam. Patient was admitted to the CCU to monitor, with tentative plan for pericardiocentesis. Patient was started on Colchicine on admission, and Decadron on 12/23 to treat both COVID-19 and pericarditis. Patient remained hemodynamically stable while being monitored in the CCU, and the plan for pericardiocentesis was on hold. Repeat bedside echo done on 12/25 to monitor the effusion, showing complete resolution of the effusion. Patient is stable for discharge home with a 3 month course of Colchicine, and Decadron taper.    New medications: Colchicine 0.6 every 12 hours for 3 months, Prednisone taper: 40mg for 4 days, 30mg for 4 days, 20mg for 4 days, 10mg for 4 days  Labs to be followed outpatient:   Exam to be followed outpatient: --   #Discharge: do not delete    Patient is 86 y/o M with past medical history of DMT2, HLD, BPH, presenting w/ pleuritic chest pain, found with pericarditis with early cardiac tamponade signs on echo and positive COVID-19 test.    Problem List/Main Diagnoses (system-based):   #CP  #Pericarditis    #U/S findings w/ effusion and early ultrasonographic tamponade physiology  -On admission, Bedside TTE showing n/l BIV function, large circumferential pericardial effusion more prominent along R heart chambers, systolic collapse of RA, mild diastolic invagination of RV, 50% respiratory variation of mitral E inflow velocity, findings c/w early echocardiographic tamponade  -VSS and pt looks very nontoxic and comfortable. Some decreased HS on exam but otherwise no JVD appreciated, no hypotension.   -In setting of pt's positive COVID test this adm as well as recent cough, most likely dx would be viral pericarditis.   -Patient remained hemodynamically stable, and plan for pericardiocentesis was put on hold   -Started pm colchicine 0.6 mg BID and Decadron 6mg q24h for tx of both pericarditis and COVID  -Repeat bedside echo done on 12/25 to monitor the effusion, showing complete resolution of the effusion.   -Discharge home with 3 month course of Colchicine and Prednisone taper     #COVID 19 infection  -12/22/20 on adm, COVID + (was previously negative 12/11/2020). CXR obtained on adm not significantly changed from 12/11 ER visit; at that visit, CT angio chest w/out ggo; granted, COVID test negative that visit.   -Inflammatory markers elevated in setting of COVID and pericarditis, downtrending   -Remained on room air throughout hospital stay, with O2 saturations >94%  -Continue Decadron 6mg q24h for tx of both pericarditis and COVID    #Elevated LFTs:  -Possibly covid-associated transaminitis  -Continue to monitor     #INES  -Creat 2.11, BUN 74 on admission, Pt looked dry on exam, and urine lytes (eg NAYELI < 20) were consistent with pre-renal picture  -Creat WNL s/p fluids  -D/c fluids on 12/23    #DM:   -A1C on admission of 6.5  -Has hx of DM, on home metformin   -mISS for now    Inpatient treatment course: 86 y/o M with PMH of DM, HLD, BPH, presenting to the ED with pleuritic chest pain. Pt recently had COVID swab 12/10 which was negative. CP was present for the last 2-3 weeks, described pleuritic in nature. CXR in ED showing lower lobe airspace opacities consistent with early COVID, and with small effusion. Bedside TTE showing n/l BIV function, large circumferential pericardial effusion more prominent along R heart chambers, systolic collapse of RA, mild diastolic invagination of RV, 50% respiratory variation of mitral E inflow velocity, findings c/w early echocardiographic tamponade. EKG showing NSR with diffuse elevated ST/t waves. Patient had no clinical signs of cardiac tamponade; vitals were stable and no JVD appreciated on physical exam. Patient was admitted to the CCU to monitor, with tentative plan for pericardiocentesis. Patient was started on Colchicine on admission, and Decadron on 12/23 to treat both COVID-19 and pericarditis. Patient remained hemodynamically stable while being monitored in the CCU, and the plan for pericardiocentesis was on hold. Repeat bedside echo done on 12/25 to monitor the effusion, showing complete resolution of the effusion. Patient is stable for discharge home with a 3 month course of Colchicine, and Decadron taper.    New medications: Colchicine 0.6 every 12 hours for 3 months, Prednisone taper: 40mg for 4 days, 30mg for 4 days, 20mg for 4 days, 10mg for 4 days  Labs to be followed outpatient:   Exam to be followed outpatient: --    Attending note	  Patient is 86 y/o M with past medical history of DMT2, HLD, BPH, presenting w/ pleuritic chest pain, found with pericarditis with early cardiac tamponade signs on echo and positive COVID-19 test.    -On admission, Bedside TTE showing n/l BIV function, large circumferential pericardial effusion more prominent along R heart chambers, systolic collapse of RA, mild diastolic invagination of RV, 50% respiratory variation of mitral E inflow velocity, findings c/w early echocardiographic tamponade  -VSS and pt looks very nontoxic and comfortable. Some decreased HS on exam but otherwise no JVD appreciated, no hypotension.   -In setting of pt's positive COVID test this adm as well as recent cough, most likely dx would be viral pericarditis.   -Patient remained hemodynamically stable, and plan for pericardiocentesis was put on hold   -Started pm colchicine 0.6 mg BID and Decadron 6mg q24h for tx of both pericarditis and COVID  -Repeat bedside echo done on 12/25 to monitor the effusion, showing complete resolution of the effusion.   -Discharge home with 3 month course of Colchicine and Prednisone taper     #COVID 19 infection  -12/22/20 on adm, COVID + (was previously negative 12/11/2020). CXR obtained on adm not significantly changed from 12/11 ER visit; at that visit, CT angio chest w/out ggo; granted, COVID test negative that visit.   -Inflammatory markers elevated in setting of COVID and pericarditis, downtrending   -Remained on room air throughout hospital stay, with O2 saturations >94%  -Continue Decadron 6mg q24h for tx of both pericarditis and COVID  I have personally provided 30 minutes of clinical care time concurrently with the residents and fellow.  This is a COVID-19 positive patient so majority of care time spent reviewing chart, data and outside of the room. Fellow conducted the exam with appropriate gown, mask and gloves. This time excludes time spent on separate procedures and time spent teaching. I have reviewed the resident’s documentation and I agree with the resident’s assessment and plan of care.  CANDELARIA Ochoa    	 #Discharge: do not delete    Patient is 84 y/o M with past medical history of DMT2, HLD, BPH, presenting w/ pleuritic chest pain, found with pericarditis with early cardiac tamponade signs on echo and positive COVID-19 test.    Problem List/Main Diagnoses (system-based):   #CP  #Pericarditis    #U/S findings w/ effusion and early ultrasonographic tamponade physiology  -On admission, Bedside TTE showing n/l BIV function, large circumferential pericardial effusion more prominent along R heart chambers, systolic collapse of RA, mild diastolic invagination of RV, 50% respiratory variation of mitral E inflow velocity, findings c/w early echocardiographic tamponade  -VSS and pt looks very nontoxic and comfortable. Some decreased HS on exam but otherwise no JVD appreciated, no hypotension.   -In setting of pt's positive COVID test this adm as well as recent cough, most likely dx would be viral pericarditis.   -Patient remained hemodynamically stable, and plan for pericardiocentesis was put on hold   -Started pm colchicine 0.6 mg BID and Decadron 6mg q24h for tx of both pericarditis and COVID  -Repeat bedside echo done on 12/25 to monitor the effusion, showing complete resolution of the effusion.   -Discharge home with 3 month course of Colchicine and Prednisone taper     #COVID 19 infection  -12/22/20 on adm, COVID + (was previously negative 12/11/2020). CXR obtained on adm not significantly changed from 12/11 ER visit; at that visit, CT angio chest w/out ggo; granted, COVID test negative that visit.   -Inflammatory markers elevated in setting of COVID and pericarditis, downtrending   -Remained on room air throughout hospital stay, with O2 saturations >94%  -Continue Decadron 6mg q24h for tx of both pericarditis and COVID    #Elevated LFTs:  -Possibly covid-associated transaminitis  -Continue to monitor     #INES  -Creat 2.11, BUN 74 on admission, Pt looked dry on exam, and urine lytes (eg NAYELI < 20) were consistent with pre-renal picture  -Creat WNL s/p fluids  -D/c fluids on 12/23    #DM:   -A1C on admission of 6.5  -Has hx of DM, on home metformin   -mISS for now while inpatient     Inpatient treatment course: 84 y/o M with PMH of DM, HLD, BPH, presenting to the ED with pleuritic chest pain. Pt recently had COVID swab 12/10 which was negative. CP was present for the last 2-3 weeks, described pleuritic in nature. CXR in ED showing lower lobe airspace opacities consistent with early COVID, and with small effusion. Bedside TTE showing n/l BIV function, large circumferential pericardial effusion more prominent along R heart chambers, systolic collapse of RA, mild diastolic invagination of RV, 50% respiratory variation of mitral E inflow velocity, findings c/w early echocardiographic tamponade. EKG showing NSR with diffuse elevated ST/t waves. Patient had no clinical signs of cardiac tamponade; vitals were stable and no JVD appreciated on physical exam. Patient was admitted to the CCU to monitor, with tentative plan for pericardiocentesis. Patient was started on Colchicine on admission, and Decadron on 12/23 to treat both COVID-19 and pericarditis. Patient remained hemodynamically stable while being monitored in the CCU, and the plan for pericardiocentesis was on hold. Repeat bedside echo done on 12/25 to monitor the effusion, showing complete resolution of the effusion. Patient is stable for discharge home with a 3 month course of Colchicine, and Decadron taper.    New medications: Colchicine 0.6 every 12 hours for 3 months, Prednisone taper: 40mg for 4 days, 30mg for 4 days, 20mg for 4 days, 10mg for 4 days  Labs to be followed outpatient: CMP to monitor for resolution of transaminitis   Exam to be followed outpatient: --    Attending note	  Patient is 84 y/o M with past medical history of DMT2, HLD, BPH, presenting w/ pleuritic chest pain, found with pericarditis with early cardiac tamponade signs on echo and positive COVID-19 test.    -On admission, Bedside TTE showing n/l BIV function, large circumferential pericardial effusion more prominent along R heart chambers, systolic collapse of RA, mild diastolic invagination of RV, 50% respiratory variation of mitral E inflow velocity, findings c/w early echocardiographic tamponade  -VSS and pt looks very nontoxic and comfortable. Some decreased HS on exam but otherwise no JVD appreciated, no hypotension.   -In setting of pt's positive COVID test this adm as well as recent cough, most likely dx would be viral pericarditis.   -Patient remained hemodynamically stable, and plan for pericardiocentesis was put on hold   -Started pm colchicine 0.6 mg BID and Decadron 6mg q24h for tx of both pericarditis and COVID  -Repeat bedside echo done on 12/25 to monitor the effusion, showing complete resolution of the effusion.   -Discharge home with 3 month course of Colchicine and Prednisone taper     #COVID 19 infection  -12/22/20 on adm, COVID + (was previously negative 12/11/2020). CXR obtained on adm not significantly changed from 12/11 ER visit; at that visit, CT angio chest w/out ggo; granted, COVID test negative that visit.   -Inflammatory markers elevated in setting of COVID and pericarditis, downtrending   -Remained on room air throughout hospital stay, with O2 saturations >94%  -Continue Decadron 6mg q24h for tx of both pericarditis and COVID  I have personally provided 30 minutes of clinical care time concurrently with the residents and fellow.  This is a COVID-19 positive patient so majority of care time spent reviewing chart, data and outside of the room. Fellow conducted the exam with appropriate gown, mask and gloves. This time excludes time spent on separate procedures and time spent teaching. I have reviewed the resident’s documentation and I agree with the resident’s assessment and plan of care.  CANDELARIA Ochoa    	 #Discharge: do not delete    Patient is 84 y/o M with past medical history of DMT2, HLD, BPH, presenting w/ pleuritic chest pain, found with pericarditis with early cardiac tamponade signs on echo and positive COVID-19 test.    Problem List/Main Diagnoses (system-based):   #CP  #Pericarditis    #U/S findings w/ effusion and early ultrasonographic tamponade physiology  -On admission, Bedside TTE showing n/l BIV function, large circumferential pericardial effusion more prominent along R heart chambers, systolic collapse of RA, mild diastolic invagination of RV, 50% respiratory variation of mitral E inflow velocity, findings c/w early echocardiographic tamponade  -VSS and pt looks very nontoxic and comfortable. Some decreased HS on exam but otherwise no JVD appreciated, no hypotension.   -In setting of pt's positive COVID test this adm as well as recent cough, most likely dx would be viral pericarditis.   -Patient remained hemodynamically stable, and plan for pericardiocentesis was put on hold   -Started pm colchicine 0.6 mg BID and Decadron 6mg q24h for tx of both pericarditis and COVID  -Repeat bedside echo done on 12/25 to monitor the effusion, showing complete resolution of the effusion.   -Discharge home with 3 month course of Colchicine and Prednisone taper     #COVID 19 infection  -12/22/20 on adm, COVID + (was previously negative 12/11/2020). CXR obtained on adm not significantly changed from 12/11 ER visit; at that visit, CT angio chest w/out ggo; granted, COVID test negative that visit.   -Inflammatory markers elevated in setting of COVID and pericarditis, downtrending   -Remained on room air throughout hospital stay, with O2 saturations >94%  -Continue Decadron 6mg q24h for tx of both pericarditis and COVID    #Elevated LFTs:  -Possibly covid-associated transaminitis  -Continue to monitor     #INES  -Creat 2.11, BUN 74 on admission, Pt looked dry on exam, and urine lytes (eg NAYELI < 20) were consistent with pre-renal picture  -Creat WNL s/p fluids  -D/c fluids on 12/23    #DM:   -A1C on admission of 6.5  -Has hx of DM, on home metformin   -mISS for now while inpatient     Inpatient treatment course: 84 y/o M with PMH of DM, HLD, BPH, presenting to the ED with pleuritic chest pain. Pt recently had COVID swab 12/10 which was negative. CP was present for the last 2-3 weeks, described pleuritic in nature. CXR in ED showing lower lobe airspace opacities consistent with early COVID, and with small effusion. Bedside TTE showing n/l BIV function, large circumferential pericardial effusion more prominent along R heart chambers, systolic collapse of RA, mild diastolic invagination of RV, 50% respiratory variation of mitral E inflow velocity, findings c/w early echocardiographic tamponade. EKG showing NSR with diffuse elevated ST/t waves. Patient had no clinical signs of cardiac tamponade; vitals were stable and no JVD appreciated on physical exam. Patient was admitted to the CCU to monitor, with tentative plan for pericardiocentesis. Patient was started on Colchicine on admission, and Decadron on 12/23 to treat both COVID-19 and pericarditis. Patient remained hemodynamically stable while being monitored in the CCU, and the plan for pericardiocentesis was on hold. Repeat bedside echo done on 12/25 to monitor the effusion, showing complete resolution of the effusion. Patient is stable for discharge home with a 3 month course of Colchicine, and Decadron taper.    New medications: Colchicine 0.6 every 12 hours for 3 months, Prednisone taper: 40mg for 4 days, 30mg for 4 days, 20mg for 4 days, 10mg for 4 days  Labs to be followed outpatient: CMP to monitor for resolution of transaminitis   Exam to be followed outpatient: Blood pressure check (restart Mirabegron if normotensive)     Attending note	  Patient is 84 y/o M with past medical history of DMT2, HLD, BPH, presenting w/ pleuritic chest pain, found with pericarditis with early cardiac tamponade signs on echo and positive COVID-19 test.    -On admission, Bedside TTE showing n/l BIV function, large circumferential pericardial effusion more prominent along R heart chambers, systolic collapse of RA, mild diastolic invagination of RV, 50% respiratory variation of mitral E inflow velocity, findings c/w early echocardiographic tamponade  -VSS and pt looks very nontoxic and comfortable. Some decreased HS on exam but otherwise no JVD appreciated, no hypotension.   -In setting of pt's positive COVID test this adm as well as recent cough, most likely dx would be viral pericarditis.   -Patient remained hemodynamically stable, and plan for pericardiocentesis was put on hold   -Started pm colchicine 0.6 mg BID and Decadron 6mg q24h for tx of both pericarditis and COVID  -Repeat bedside echo done on 12/25 to monitor the effusion, showing complete resolution of the effusion.   -Discharge home with 3 month course of Colchicine and Prednisone taper     #COVID 19 infection  -12/22/20 on adm, COVID + (was previously negative 12/11/2020). CXR obtained on adm not significantly changed from 12/11 ER visit; at that visit, CT angio chest w/out ggo; granted, COVID test negative that visit.   -Inflammatory markers elevated in setting of COVID and pericarditis, downtrending   -Remained on room air throughout hospital stay, with O2 saturations >94%  -Continue Decadron 6mg q24h for tx of both pericarditis and COVID  I have personally provided 30 minutes of clinical care time concurrently with the residents and fellow.  This is a COVID-19 positive patient so majority of care time spent reviewing chart, data and outside of the room. Fellow conducted the exam with appropriate gown, mask and gloves. This time excludes time spent on separate procedures and time spent teaching. I have reviewed the resident’s documentation and I agree with the resident’s assessment and plan of care.  CANDELARIA Ochoa

## 2020-12-25 NOTE — DISCHARGE NOTE PROVIDER - NSDCCPCAREPLAN_GEN_ALL_CORE_FT
PRINCIPAL DISCHARGE DIAGNOSIS  Diagnosis: Pericarditis  Assessment and Plan of Treatment:   You will be discharged home with a steroid taper (Prednisone) which may lower the inflammation caused by the virus. The Prednisone comes in 10 mg tabs, you are advised to take 4 pills for 4 days, then 3 pills for 4 days, then 2 pills for 4 days, and lastly 1 pill for 4 days, which will complete a 16 day course.      SECONDARY DISCHARGE DIAGNOSES  Diagnosis: COVID-19  Assessment and Plan of Treatment: You were found to have COVID-19 on arrival to the hospital.   Get medical attention right away if you develop emergency warning signs of COVID-19 such as: trouble breathing, chest pain or pressure that does not go away, new confusion or not able to wake up, bluish lips or face.  The virus is spread easily through tiny droplets when you cough or sneeze. You should take these steps to help prevent the disease from spreading to people in your home and community.    Diagnosis: BPH (benign prostatic hyperplasia)  Assessment and Plan of Treatment:     Diagnosis: Diabetes mellitus  Assessment and Plan of Treatment:      PRINCIPAL DISCHARGE DIAGNOSIS  Diagnosis: Pericarditis  Assessment and Plan of Treatment: Pericarditis is an inflammation of the pericardium, which is the sac that surrounds the heart. The pericardium normally functions to protect the heart and reduce friction between the heart and surrounding organs. Pericarditis may be accompanied by pericardial effusion, which is fluid accumulation in the pericardial sac. If a large amount of fluid accumulates in the pericardium, it may squeeze or constrict the heart; this is called cardiac tamponade. Cardiac tamponade is a serious condition that can be life-threatening if not recognized and treated promptly.  There are many different causes of pericarditis; we believe your case was caused by COVID-19.   Upon your arrival to the hospital, we did an ultrasound of your heart, which showed fluid accumulation in the heart sac. We started you on Colchicine and Steroids, which are treatment options for pericarditis to reduce the inflammation. We repeated the ultrasound after 2 days of treatment, which showed complete resolution of the fluid around your heart sac.   You will be discharged home with a steroid taper (Prednisone) which may lower the inflammation caused by the virus. The Prednisone comes in 10 mg tabs, you are advised to take 4 pills for 4 days, then 3 pills for 4 days, then 2 pills for 4 days, and lastly 1 pill for 4 days, which will complete a 16 day course.  You will also continue Colchicine 0.6 every 12 hours for 3 months.   Please follow-up with your primary care physician within 1-2 weeks of discharge.  Get medical attention right away if you develop emergency warning signs such as: trouble breathing, chest pain or pressure that does not go away, new confusion or not able to wake up, bluish lips or face.      SECONDARY DISCHARGE DIAGNOSES  Diagnosis: COVID-19  Assessment and Plan of Treatment: You were found to have COVID-19 on arrival to the hospital.   Get medical attention right away if you develop emergency warning signs of COVID-19 such as: trouble breathing, chest pain or pressure that does not go away, new confusion or not able to wake up, bluish lips or face.  The virus is spread easily through tiny droplets when you cough or sneeze. You should take these steps to help prevent the disease from spreading to people in your home and community.    Diagnosis: BPH (benign prostatic hyperplasia)  Assessment and Plan of Treatment:     Diagnosis: Diabetes mellitus  Assessment and Plan of Treatment:      PRINCIPAL DISCHARGE DIAGNOSIS  Diagnosis: Pericarditis  Assessment and Plan of Treatment: Pericarditis is an inflammation of the pericardium, which is the sac that surrounds the heart. The pericardium normally functions to protect the heart and reduce friction between the heart and surrounding organs. Pericarditis may be accompanied by pericardial effusion, which is fluid accumulation in the pericardial sac. If a large amount of fluid accumulates in the pericardium, it may squeeze or constrict the heart; this is called cardiac tamponade. Cardiac tamponade is a serious condition that can be life-threatening if not recognized and treated promptly.  There are many different causes of pericarditis; we believe your case was caused by COVID-19.   Upon your arrival to the hospital, we did an ultrasound of your heart, which showed fluid accumulation in the heart sac. We started you on Colchicine and Steroids, which are treatment options for pericarditis to reduce the inflammation. We repeated the ultrasound after 2 days of treatment, which showed complete resolution of the fluid around your heart sac.   You will be discharged home with a steroid taper (Prednisone) which may lower the inflammation caused by the virus. The Prednisone comes in 10 mg tabs, you are advised to take 4 pills for 4 days, then 3 pills for 4 days, then 2 pills for 4 days, and lastly 1 pill for 4 days, which will complete a 16 day course.  You will also continue Colchicine 0.6 every 12 hours for 3 months.   In addition, please take Protonix 40 mg daily for a month while you are on the Prednisone taper, which helps protect your stomach.   Please follow-up with your primary care physician within 1-2 weeks of discharge.  Get medical attention right away if you develop emergency warning signs such as: trouble breathing, chest pain or pressure that does not go away, new confusion or not able to wake up, bluish lips or face.      SECONDARY DISCHARGE DIAGNOSES  Diagnosis: COVID-19  Assessment and Plan of Treatment: You were found to have COVID-19 on arrival to the hospital.   Get medical attention right away if you develop emergency warning signs of COVID-19 such as: trouble breathing, chest pain or pressure that does not go away, new confusion or not able to wake up, bluish lips or face.  The virus is spread easily through tiny droplets when you cough or sneeze. You should take these steps to help prevent the disease from spreading to people in your home and community.    Diagnosis: BPH (benign prostatic hyperplasia)  Assessment and Plan of Treatment: Benign prostatic hyperplasia is an enlarged prostate. You came in to the hospital with 3 home medications, including Flomax, Mirabegran, and Solifenacin.   While you were in the hospital, your blood pressure was noted to be high. Your medication Mirabegran can cause high blood pressure, therefore please stop taking Mirabegran until you follow up with your primary care physician in 1-2 weeks. Your doctor will check your blood pressure at that time in the office, and will decide whether or not you should continue it.   Please continue taking your Flomax and Solifenacin as prescribed, and continue following with your outpatient doctor.    Diagnosis: Diabetes mellitus  Assessment and Plan of Treatment: Please continue your home Metformin, and continue following with your outpatient doctor for further management of your diabetes.

## 2020-12-25 NOTE — DISCHARGE NOTE PROVIDER - NSDCMRMEDTOKEN_GEN_ALL_CORE_FT
fenofibrate 145 mg oral tablet: 1 tab(s) orally once a day  metFORMIN 500 mg oral tablet, extended release: 1 tab(s) orally once a day (in the evening)  mirabegron 50 mg oral tablet, extended release: 1 tab(s) orally once a day  solifenacin 5 mg oral tablet: 1 tab(s) orally once a day  tamsulosin 0.4 mg oral capsule: 1 cap(s) orally once a day   colchicine 0.6 mg oral tablet: 1 tab(s) orally 2 times a day for 90 days   fenofibrate 145 mg oral tablet: 1 tab(s) orally once a day  metFORMIN 500 mg oral tablet, extended release: 1 tab(s) orally once a day (in the evening)  predniSONE 10 mg oral tablet: 4 tab(s) orally once a day for 3 days  Protonix 40 mg oral delayed release tablet: 1 tab(s) orally once a day for 30 days   solifenacin 5 mg oral tablet: 1 tab(s) orally once a day  tamsulosin 0.4 mg oral capsule: 1 cap(s) orally once a day

## 2020-12-25 NOTE — PROGRESS NOTE ADULT - ASSESSMENT
86 y/o m with PMH of DM, HLD, BPH, presenting w/ pleuritic CP, found with pericarditis with early cardiac tamponade and positive covid test, being transferred to [facility] for monitoring.     Cardiology:  #CP  #Pericarditis    #U/S findings w/ effusion and early ultrasonographic tamponade physiology  -Bedside TTE showing n/l BIV function, large circumferential pericardial effusion more prominent along R heart chambers, systolic collapse of RA, mild diastolic invagination of RV, 50% respiratory variation of mitral E inflow velocity, findings c/w early echocardiographic tamponade  -VSS and pt looks very nontoxic and comfortable. Some decreased HS on exam but otherwise no JVD appreciated, no hypotension.   -In setting of pt's positive COVID test this adm as well as recent cough, most likely dx would be viral pericarditis.   -Planned for pericardiocentesis on 12/23 and 12/24, however it didn't happen due to scheduling circumstances   -Continue colchicine 0.6 mg BID. Start Decadron 6mg q24h for tx of both pericarditis and COVID  -Planned for pericardiocentesis today; NPO after breakfast     #Elevated troponins  -0.04 => 0.01 this adm. EKG showing mild ST elev but DIFFUSE, c/w pericarditis  -Elevated trops came down; was most likely demand ischemia    ID:   #COVID 19 infection  -12/22/20 on adm, COVID + (was previously negative 12/11/2020). CXR obtained on adm not significantly changed from 12/11 ER visit; at that visit, CT angio chest w/out ggo; granted, COVID test negative that visit.   -Inflammatory markers elevated in setting of COVID and pericarditis   -Monitor on RA, satting 95%  -Continue Decadron 6mg q24h for tx of both pericarditis and COVID    #Leukocytosis: pt's slightly elevated WBC most likely from COVID infection as above, continue COVID mgmt as above    Neurology: No active issues  Pulmonary: No active issues    GI:  #Elevated LFTs:  -On adm, AST 60, ALT 92, now WNL  -Possibly covid-associated transaminitis    :  #BPH:  -On home Mirabegran, Solifenacin, Flomax  -Continue home Flomax     Renal:  #INES  -Creat 2.11, BUN 74 on admission, Creat 0.91 this morning s/p fluids  -Pt looked dry on exam, and urine lytes (eg NAYELI < 20) were consistent with pre-renal picture  -D/c fluids on 12/23    MSK: No active issues    Metabolic:  #Hyperkalemia  -Mildy hyperkalemic on adm to 5.4. No EKG findings of hyperkalemia. Likely in setting of pt's INES.  -K+ wnl this morning     Endocrine:  #DM:   -A1C on admission of 6.5  -Has hx of DM, on home metformin   -mISS for now    Heme:  #Anemia:  -Hb 10.7, MCV 97.1.   -No signs of bleeding  -No past records of labs on file found    #Mildly elevated INR   -Adm INR 1.29, PT 15.3, PTT 23.  -Pt also with some mildly elevated transaminitis as discussed above. Albumin n/l.   -Will monitor    FEN: NPO after breakfast. Replete lytes PRN K>4, Mg>2  PPx: Lovenox 40 mg Q24  GI: Pepcid 20 daily  Code: FULL CODE  Dispo: Patient requires continued monitoring in 3W 86 y/o m with PMH of DM, HLD, BPH, presenting w/ pleuritic CP, found with pericarditis with early cardiac tamponade and positive COVID test, being transferred to [facility] for monitoring while awaiting pericardiocentesis.     Cardiology:  #CP  #Pericarditis    #U/S findings w/ effusion and early ultrasonographic tamponade physiology  -On admission, Bedside TTE showing n/l BIV function, large circumferential pericardial effusion more prominent along R heart chambers, systolic collapse of RA, mild diastolic invagination of RV, 50% respiratory variation of mitral E inflow velocity, findings c/w early echocardiographic tamponade  -VSS and pt looks very nontoxic and comfortable. Some decreased HS on exam but otherwise no JVD appreciated, no hypotension.   -In setting of pt's positive COVID test this adm as well as recent cough, most likely dx would be viral pericarditis.   -Planned for pericardiocentesis on 12/23 and 12/24, however it didn't happen due to scheduling circumstances   -Continue colchicine 0.6 mg BID. Start Decadron 6mg q24h for tx of both pericarditis and COVID  -Tentative plan for pericardiocentesis today vs tomorrow (12/26); NPO when certain   -Will perform limited TTE today to monitor the effusion     #Elevated troponins  -0.04 => 0.01 on admission. EKG showing mild ST elev but DIFFUSE, c/w pericarditis  -Elevated trops came down; was most likely demand ischemia    ID:   #COVID 19 infection  -12/22/20 on adm, COVID + (was previously negative 12/11/2020). CXR obtained on adm not significantly changed from 12/11 ER visit; at that visit, CT angio chest w/out ggo; granted, COVID test negative that visit.   -Inflammatory markers elevated in setting of COVID and pericarditis, downtrending   -Monitor on RA, satting 95%  -Continue Decadron 6mg q24h for tx of both pericarditis and COVID    #Leukocytosis: pt's slightly elevated WBC on admission, most likely from COVID infection as above, continue COVID mgmt as above    Neurology: No active issues  Pulmonary: No active issues    GI:  #Elevated LFTs:  -Possibly covid-associated transaminitis  -Continue to monitor     :  #BPH:  -On home Mirabegran, Solifenacin, Flomax  -Continue home Flomax     Renal:  #INES  -Creat 2.11, BUN 74 on admission, Pt looked dry on exam, and urine lytes (eg NAYELI < 20) were consistent with pre-renal picture  -Creat WNL s/p fluids  -D/c fluids on 12/23    MSK: No active issues    Metabolic:  #Hyperkalemia  -Mildy hyperkalemic on adm to 5.4. No EKG findings of hyperkalemia. Likely in setting of pt's INES.  -K+ wnl this morning     Endocrine:  #DM:   -A1C on admission of 6.5  -Has hx of DM, on home metformin   -mISS for now    Heme:  #Anemia:  -Hb 10.7, MCV 97.1.   -No signs of bleeding  -No past records of labs on file found    #Mildly elevated INR   -Adm INR 1.29, PT 15.3, PTT 23.  -Pt also with some mildly elevated transaminitis as discussed above. Albumin n/l.   -Will monitor    FEN: DASH diet. Replete lytes PRN K>4, Mg>2  PPx: Lovenox 40 mg Q24  GI: Pepcid 20 daily  Code: FULL CODE  Dispo: Patient requires continued monitoring in 3W, pending pericardiocentesis

## 2020-12-25 NOTE — DISCHARGE NOTE PROVIDER - CARE PROVIDER_API CALL
Eliel Deal  CARDIOVASCULAR DISEASE  [address] [place] [address], [place] [address]  Phone: [phone]  Fax: [phone]  Follow Up Time: 1 week

## 2020-12-26 ENCOUNTER — TRANSCRIPTION ENCOUNTER (OUTPATIENT)
Age: 85
End: 2020-12-26

## 2020-12-26 VITALS — HEART RATE: 66 BPM

## 2020-12-26 LAB
ALBUMIN SERPL ELPH-MCNC: 3.4 G/DL — SIGNIFICANT CHANGE UP (ref 3.3–5)
ALP SERPL-CCNC: 47 U/L — SIGNIFICANT CHANGE UP (ref 40–120)
ALT FLD-CCNC: 85 U/L — HIGH (ref 10–45)
ANION GAP SERPL CALC-SCNC: 13 MMOL/L — SIGNIFICANT CHANGE UP (ref 5–17)
APTT BLD: 28.1 SEC — SIGNIFICANT CHANGE UP (ref 27.5–35.5)
AST SERPL-CCNC: 42 U/L — HIGH (ref 10–40)
BASOPHILS # BLD AUTO: 0.01 K/UL — SIGNIFICANT CHANGE UP (ref 0–0.2)
BASOPHILS NFR BLD AUTO: 0.1 % — SIGNIFICANT CHANGE UP (ref 0–2)
BILIRUB SERPL-MCNC: 0.3 MG/DL — SIGNIFICANT CHANGE UP (ref 0.2–1.2)
BUN SERPL-MCNC: 30 MG/DL — HIGH (ref 7–23)
CALCIUM SERPL-MCNC: 9.4 MG/DL — SIGNIFICANT CHANGE UP (ref 8.4–10.5)
CHLORIDE SERPL-SCNC: 106 MMOL/L — SIGNIFICANT CHANGE UP (ref 96–108)
CO2 SERPL-SCNC: 24 MMOL/L — SIGNIFICANT CHANGE UP (ref 22–31)
CREAT SERPL-MCNC: 0.79 MG/DL — SIGNIFICANT CHANGE UP (ref 0.5–1.3)
CRP SERPL-MCNC: 5.54 MG/DL — HIGH (ref 0–0.4)
D DIMER BLD IA.RAPID-MCNC: 1628 NG/ML DDU — HIGH
EOSINOPHIL # BLD AUTO: 0.12 K/UL — SIGNIFICANT CHANGE UP (ref 0–0.5)
EOSINOPHIL NFR BLD AUTO: 1.4 % — SIGNIFICANT CHANGE UP (ref 0–6)
FERRITIN SERPL-MCNC: 290 NG/ML — SIGNIFICANT CHANGE UP (ref 30–400)
GLUCOSE SERPL-MCNC: 94 MG/DL — SIGNIFICANT CHANGE UP (ref 70–99)
HCT VFR BLD CALC: 34.2 % — LOW (ref 39–50)
HGB BLD-MCNC: 11.2 G/DL — LOW (ref 13–17)
IMM GRANULOCYTES NFR BLD AUTO: 0.4 % — SIGNIFICANT CHANGE UP (ref 0–1.5)
INR BLD: 1.23 — HIGH (ref 0.88–1.16)
LYMPHOCYTES # BLD AUTO: 1.13 K/UL — SIGNIFICANT CHANGE UP (ref 1–3.3)
LYMPHOCYTES # BLD AUTO: 13.5 % — SIGNIFICANT CHANGE UP (ref 13–44)
MAGNESIUM SERPL-MCNC: 2.1 MG/DL — SIGNIFICANT CHANGE UP (ref 1.6–2.6)
MCHC RBC-ENTMCNC: 31.2 PG — SIGNIFICANT CHANGE UP (ref 27–34)
MCHC RBC-ENTMCNC: 32.7 GM/DL — SIGNIFICANT CHANGE UP (ref 32–36)
MCV RBC AUTO: 95.3 FL — SIGNIFICANT CHANGE UP (ref 80–100)
MONOCYTES # BLD AUTO: 0.8 K/UL — SIGNIFICANT CHANGE UP (ref 0–0.9)
MONOCYTES NFR BLD AUTO: 9.5 % — SIGNIFICANT CHANGE UP (ref 2–14)
NEUTROPHILS # BLD AUTO: 6.3 K/UL — SIGNIFICANT CHANGE UP (ref 1.8–7.4)
NEUTROPHILS NFR BLD AUTO: 75.1 % — SIGNIFICANT CHANGE UP (ref 43–77)
NRBC # BLD: 0 /100 WBCS — SIGNIFICANT CHANGE UP (ref 0–0)
PLATELET # BLD AUTO: 358 K/UL — SIGNIFICANT CHANGE UP (ref 150–400)
POTASSIUM SERPL-MCNC: 3.9 MMOL/L — SIGNIFICANT CHANGE UP (ref 3.5–5.3)
POTASSIUM SERPL-SCNC: 3.9 MMOL/L — SIGNIFICANT CHANGE UP (ref 3.5–5.3)
PROT SERPL-MCNC: 6.3 G/DL — SIGNIFICANT CHANGE UP (ref 6–8.3)
PROTHROM AB SERPL-ACNC: 14.6 SEC — HIGH (ref 10.6–13.6)
RBC # BLD: 3.59 M/UL — LOW (ref 4.2–5.8)
RBC # FLD: 12.3 % — SIGNIFICANT CHANGE UP (ref 10.3–14.5)
SODIUM SERPL-SCNC: 143 MMOL/L — SIGNIFICANT CHANGE UP (ref 135–145)
WBC # BLD: 8.39 K/UL — SIGNIFICANT CHANGE UP (ref 3.8–10.5)
WBC # FLD AUTO: 8.39 K/UL — SIGNIFICANT CHANGE UP (ref 3.8–10.5)

## 2020-12-26 PROCEDURE — 71045 X-RAY EXAM CHEST 1 VIEW: CPT | Mod: 26

## 2020-12-26 PROCEDURE — 99291 CRITICAL CARE FIRST HOUR: CPT | Mod: CS

## 2020-12-26 RX ORDER — PANTOPRAZOLE SODIUM 20 MG/1
1 TABLET, DELAYED RELEASE ORAL
Qty: 30 | Refills: 0
Start: 2020-12-26 | End: 2021-01-24

## 2020-12-26 RX ORDER — COLCHICINE 0.6 MG
1 TABLET ORAL
Qty: 180 | Refills: 0
Start: 2020-12-26 | End: 2021-03-25

## 2020-12-26 RX ORDER — MIRABEGRON 50 MG/1
1 TABLET, EXTENDED RELEASE ORAL
Qty: 0 | Refills: 0 | DISCHARGE

## 2020-12-26 RX ORDER — COLCHICINE 0.6 MG
1 TABLET ORAL
Qty: 60 | Refills: 2
Start: 2020-12-26 | End: 2021-03-25

## 2020-12-26 RX ADMIN — CHLORHEXIDINE GLUCONATE 1 APPLICATION(S): 213 SOLUTION TOPICAL at 07:13

## 2020-12-26 RX ADMIN — Medication 6 MILLIGRAM(S): at 11:38

## 2020-12-26 RX ADMIN — Medication 0.6 MILLIGRAM(S): at 11:38

## 2020-12-27 ENCOUNTER — TRANSCRIPTION ENCOUNTER (OUTPATIENT)
Age: 85
End: 2020-12-27

## 2020-12-28 ENCOUNTER — TRANSCRIPTION ENCOUNTER (OUTPATIENT)
Age: 85
End: 2020-12-28

## 2020-12-29 ENCOUNTER — TRANSCRIPTION ENCOUNTER (OUTPATIENT)
Age: 85
End: 2020-12-29

## 2020-12-29 DIAGNOSIS — R74.01 ELEVATION OF LEVELS OF LIVER TRANSAMINASE LEVELS: ICD-10-CM

## 2020-12-29 DIAGNOSIS — I24.8 OTHER FORMS OF ACUTE ISCHEMIC HEART DISEASE: ICD-10-CM

## 2020-12-29 DIAGNOSIS — B33.23 VIRAL PERICARDITIS: ICD-10-CM

## 2020-12-29 DIAGNOSIS — U07.1 COVID-19: ICD-10-CM

## 2020-12-29 DIAGNOSIS — N40.0 BENIGN PROSTATIC HYPERPLASIA WITHOUT LOWER URINARY TRACT SYMPTOMS: ICD-10-CM

## 2020-12-29 DIAGNOSIS — R79.1 ABNORMAL COAGULATION PROFILE: ICD-10-CM

## 2020-12-29 DIAGNOSIS — E11.9 TYPE 2 DIABETES MELLITUS WITHOUT COMPLICATIONS: ICD-10-CM

## 2020-12-29 DIAGNOSIS — N17.9 ACUTE KIDNEY FAILURE, UNSPECIFIED: ICD-10-CM

## 2020-12-29 DIAGNOSIS — D64.9 ANEMIA, UNSPECIFIED: ICD-10-CM

## 2020-12-29 DIAGNOSIS — Z87.891 PERSONAL HISTORY OF NICOTINE DEPENDENCE: ICD-10-CM

## 2020-12-29 DIAGNOSIS — I31.3 PERICARDIAL EFFUSION (NONINFLAMMATORY): ICD-10-CM

## 2020-12-29 DIAGNOSIS — D72.829 ELEVATED WHITE BLOOD CELL COUNT, UNSPECIFIED: ICD-10-CM

## 2020-12-29 DIAGNOSIS — E78.5 HYPERLIPIDEMIA, UNSPECIFIED: ICD-10-CM

## 2020-12-29 DIAGNOSIS — Z79.84 LONG TERM (CURRENT) USE OF ORAL HYPOGLYCEMIC DRUGS: ICD-10-CM

## 2020-12-29 DIAGNOSIS — I31.4 CARDIAC TAMPONADE: ICD-10-CM

## 2020-12-29 DIAGNOSIS — E87.5 HYPERKALEMIA: ICD-10-CM

## 2021-01-03 PROCEDURE — 93308 TTE F-UP OR LMTD: CPT | Mod: 26

## 2021-01-18 PROCEDURE — 82728 ASSAY OF FERRITIN: CPT

## 2021-01-18 PROCEDURE — 93321 DOPPLER ECHO F-UP/LMTD STD: CPT

## 2021-01-18 PROCEDURE — 86140 C-REACTIVE PROTEIN: CPT

## 2021-01-18 PROCEDURE — 83880 ASSAY OF NATRIURETIC PEPTIDE: CPT

## 2021-01-18 PROCEDURE — 84145 PROCALCITONIN (PCT): CPT

## 2021-01-18 PROCEDURE — U0003: CPT

## 2021-01-18 PROCEDURE — 36415 COLL VENOUS BLD VENIPUNCTURE: CPT

## 2021-01-18 PROCEDURE — 80061 LIPID PANEL: CPT

## 2021-01-18 PROCEDURE — 80053 COMPREHEN METABOLIC PANEL: CPT

## 2021-01-18 PROCEDURE — 80074 ACUTE HEPATITIS PANEL: CPT

## 2021-01-18 PROCEDURE — 85610 PROTHROMBIN TIME: CPT

## 2021-01-18 PROCEDURE — 82962 GLUCOSE BLOOD TEST: CPT

## 2021-01-18 PROCEDURE — 99285 EMERGENCY DEPT VISIT HI MDM: CPT | Mod: 25

## 2021-01-18 PROCEDURE — 93308 TTE F-UP OR LMTD: CPT

## 2021-01-18 PROCEDURE — 83036 HEMOGLOBIN GLYCOSYLATED A1C: CPT

## 2021-01-18 PROCEDURE — 85730 THROMBOPLASTIN TIME PARTIAL: CPT

## 2021-01-18 PROCEDURE — 96360 HYDRATION IV INFUSION INIT: CPT

## 2021-01-18 PROCEDURE — 85025 COMPLETE CBC W/AUTO DIFF WBC: CPT

## 2021-01-18 PROCEDURE — 83605 ASSAY OF LACTIC ACID: CPT

## 2021-01-18 PROCEDURE — 84484 ASSAY OF TROPONIN QUANT: CPT

## 2021-01-18 PROCEDURE — 84100 ASSAY OF PHOSPHORUS: CPT

## 2021-01-18 PROCEDURE — 83735 ASSAY OF MAGNESIUM: CPT

## 2021-01-18 PROCEDURE — 84300 ASSAY OF URINE SODIUM: CPT

## 2021-01-18 PROCEDURE — 83615 LACTATE (LD) (LDH) ENZYME: CPT

## 2021-01-18 PROCEDURE — 71045 X-RAY EXAM CHEST 1 VIEW: CPT

## 2021-01-18 PROCEDURE — 85379 FIBRIN DEGRADATION QUANT: CPT

## 2021-01-18 PROCEDURE — 82570 ASSAY OF URINE CREATININE: CPT

## 2021-03-06 NOTE — DISCHARGE NOTE NURSING/CASE MANAGEMENT/SOCIAL WORK - PATIENT PORTAL LINK FT
. You can access the FollowMyHealth Patient Portal offered by [facility] by registering at the following website: [email or web address]. By joining [employer]’s FollowMyHealth portal, you will also be able to view your health information using other applications (apps) compatible with our system.

## 2021-07-23 NOTE — ED ADULT NURSE NOTE - NSHOSCREENINGQ1_ED_ALL_ED
Patient : Juliano Harry Age: 57 year old Sex: male   MRN: [id number] Encounter Date: 7/23/2021      History     Chief Complaint   Patient presents with   • Shortness of Breath   • Cough     Pt is [place]ian speaking. History was obtained with the assistance of a .    HPI  H01/H01  7/23/2021  5:23 PM Juliano Harry is a 57 year old male who presents to the ED for evaluation of cough, congestion, and SOB that began several months ago. Pt states that over the last week when he sleeps he becomes unable to breathe, which causes him to be scared to go to sleep. He has not slept in 2 days. Pt has not had a sleep study completed and was see in the ED on 06/2/2021 and was dx with COPD and a viral URI. At that time they gave him prednisone. Pt states that he has been experiencing the cough and congestion since he \"had a scope done in his nose to see something in his stomach\" and he has not been able to breath properly. Pt denies any pain, rash, or swelling. There are no further complaints or modifying factors at this time.    PCP: No Pcp    No Known Allergies    Current Discharge Medication List      Prior to Admission Medications    Details   diphenhydrAMINE (Benadryl Allergy) 25 MG tablet Take 1 tablet by mouth 3 times daily as needed for Itching, Allergies or Sleep.  Qty: 30 tablet, Refills: 0      cetirizine (ZyrTEC) 10 MG tablet Take 1 tablet by mouth daily.  Qty: 30 tablet, Refills: 0      fluticasone-salmeterol 500-50 MCG/DOSE inhaler Inhale 1 puff into the lungs 2 times daily.  Qty: 60 each, Refills: 3      albuterol 108 (90 Base) MCG/ACT inhaler Inhale 2 puffs into the lungs every 4 hours as needed for Shortness of Breath or Wheezing.  Qty: 1 each, Refills: 3      pantoprazole (PROTONIX) 40 MG tablet Take 1 tablet by mouth daily.  Qty: 90 tablet, Refills: 3      Mometasone Furoate Powder 0.001 g 2 times daily. Add 1mg mometasone tablet to 240cc NeilMed bottle with salt tablet twice a  day  Qty: 0.06 g, Refills: 11         Discontinued Medications       clarithromycin (BIAXIN) 500 MG tablet        amoxicillin (AMOXIL) 500 MG tablet              Past Medical History:   Diagnosis Date   • Chronic cough 2021   • COVID-19 virus detected 2020    Positive test   • Dysphagia 9/3/2020   • Gastroesophageal reflux disease    • Gastroesophageal reflux disease without esophagitis 2021       Past Surgical History:   Procedure Laterality Date   • COLONOSCOPY W/ POLYPECTOMY  10/27/2020    Dr. Howard Guajardo, Recall in 5 years, Polyps removed, internal hemorrhoids,   • EGD  2020    with dilation   • [place] ESOPHAGRAM BARIUM WITH AIR CONTRAST  2020    Joyce ZUNIGA. Esophagram       Family History   Problem Relation Age of Onset   • Diabetes Father         75    • Diabetes Sister         50    • Diabetes Sister        Social History     Tobacco Use   • Smoking status: Former Smoker     Packs/day: 0.80     Years: 34.00     Pack years: 27.20     Types: Cigarettes   • Smokeless tobacco: Never Used   Vaping Use   • Vaping Use: Never assessed   Substance Use Topics   • Alcohol use: No   • Drug use: No       E-cigarette/Vaping     E-Cigarette/Vaping Substances & Devices   • Nicotine No    • THC No    • CBD No        Review of Systems   Constitutional: Negative for chills and fever.   HENT: Positive for congestion.    Eyes: Negative for visual disturbance.   Respiratory: Positive for cough. Negative for chest tightness and shortness of breath.    Cardiovascular: Negative for chest pain.   Gastrointestinal: Negative for abdominal pain, constipation, diarrhea and vomiting.   Genitourinary: Negative for difficulty urinating and dysuria.   Musculoskeletal: Negative for arthralgias and back pain.   Skin: Negative for rash.   Neurological: Negative for dizziness.   Psychiatric/Behavioral: Positive for sleep disturbance. Negative for behavioral problems.       Physical Exam     ED Triage  Vitals [07/23/21 1437]   ED Triage Vitals Group      Temp 97.8 °F (36.6 °C)      Heart Rate 77      Resp 18      BP (!) 147/87      SpO2 95 %      EtCO2 mmHg       Height       Weight       Weight Scale Used       BMI (Calculated)       IBW/kg (Calculated)        Physical Exam  Vitals and nursing note reviewed.   Constitutional:       Appearance: He is well-developed.   HENT:      Head: Normocephalic.   Eyes:      Pupils: Pupils are equal, round, and reactive to light.   Cardiovascular:      Rate and Rhythm: Normal rate and regular rhythm.   Pulmonary:      Effort: Pulmonary effort is normal.      Breath sounds: Normal breath sounds.   Abdominal:      General: There is no distension.      Tenderness: There is no abdominal tenderness.   Musculoskeletal:         General: No tenderness. Normal range of motion.      Cervical back: Normal range of motion.   Skin:     General: Skin is warm and dry.   Neurological:      Mental Status: He is alert and oriented to person, place, and time.         ED Course     Procedures    Lab Results     Results for orders placed or performed during the hospital encounter of 07/23/21   CBC with Automated Differential (performable only)   Result Value Ref Range    WBC 10.3 4.2 - 11.0 K/mcL    RBC 4.80 4.50 - 5.90 mil/mcL    HGB 14.4 13.0 - 17.0 g/dL    HCT 42.8 39.0 - 51.0 %    MCV 89.2 78.0 - 100.0 fl    MCH 30.0 26.0 - 34.0 pg    MCHC 33.6 32.0 - 36.5 g/dL    RDW-CV 13.9 11.0 - 15.0 %    RDW-SD 45.5 39.0 - 50.0 fL     140 - 450 K/mcL    NRBC 0 <=0 /100 WBC    Neutrophil, Percent 59 %    Lymphocytes, Percent 31 %    Mono, Percent 7 %    Eosinophils, Percent 2 %    Basophils, Percent 0 %    Immature Granulocytes 1 %    Absolute Neutrophils 6.2 1.8 - 7.7 K/mcL    Absolute Lymphocytes 3.2 1.0 - 4.0 K/mcL    Absolute Monocytes 0.7 0.3 - 0.9 K/mcL    Absolute Eosinophils  0.2 0.0 - 0.5 K/mcL    Absolute Basophils 0.0 0.0 - 0.3 K/mcL    Absolute Immmature Granulocytes 0.1 0.0 - 0.2 K/mcL    ISTAT8 VENOUS  POINT OF CARE   Result Value Ref Range    BUN - POINT OF CARE <3 (L) 6 - 20 mg/dL    SODIUM - POINT OF CARE 139 135 - 145 mmol/L    POTASSIUM - POINT OF CARE 4.1 3.4 - 5.1 mmol/L    CHLORIDE - POINT OF CARE 100 98 - 107 mmol/L    TCO2 - POINT OF CARE 24 19 - 24 mmol/L    ANION GAP - POINT OF CARE 21 (H) 10 - 20 mmol/L    HEMATOCRIT - POINT OF CARE 46.0 39.0 - 51.0 %    HEMOGLOBIN - POINT OF CARE 15.6 13.0 - 17.0 g/dL    GLUCOSE - POINT OF CARE 108 (H) 70 - 99 mg/dL    CALCIUM, IONIZED - POINT OF CARE 1.19 1.15 - 1.29 mmol/L    Creatinine 0.70 0.67 - 1.17 mg/dL    Glomerular Filtration Rate >90 >90 mL/min/1.73m2       EKG Results     Completed 07/23/2021   Results for orders placed or performed during the hospital encounter of 07/23/21   Electrocardiogram 12-Lead   Result Value Ref Range    Ventricular Rate EKG/Min (BPM) 79     Atrial Rate (BPM) 79     SC-Interval (MSEC) 176     QRS-Interval (MSEC) 80     QT-Interval (MSEC) 374     QTc 429     P Axis (Degrees) 5     R Axis (Degrees) 53     T Axis (Degrees) 20     REPORT TEXT       Normal sinus rhythm  Normal ECG  When compared with ECG of  23-JUN-2021 09:42,  T wave amplitude has increased in  Lateral leads  Confirmed by RAYMUNDO WHITTINGTON, KATIE MYLES ([id number]),  Denice Navarro ([id number]) on 7/23/2021 5:59:58 PM           Radiology Results     Imaging Results          XR Chest AP or PA (Final result)  Result time 07/23/21 15:57:29    Final result                 Impression:    IMPRESSION:    No acute cardiopulmonary process.    I, Attending Radiologist Srini Mota MD, have reviewed the images and  report and concur with these findings interpreted by Resident Radiologist,  Sorin Briones MD.              Narrative:    XR CHEST AP OR PA     CLINICAL INFORMATION: Cough.    COMPARISON: Chest radiograph 6/23/2021.    TECHNIQUE: Frontal upright view of the chest.    FINDINGS:    The cardiomediastinal contour is within normal limits. The pulmonary  vasculature is  normally distributed. The lungs are clear. No pleural  effusion. No pneumothorax.                    Preliminary result                 Impression:      No acute cardiopulmonary process.             Narrative:    XR CHEST AP OR PA     CLINICAL INFORMATION: Cough.    COMPARISON: Chest radiograph 6/23/2021.    TECHNIQUE: Frontal upright view of the chest.    FINDINGS:    The cardiomediastinal contour is within normal limits. The pulmonary  vasculature is normally distributed. The lungs are clear. No pleural  effusion. No pneumothorax.                                  ED Medication Orders (From admission, onward)    None               MDM  Vitals  Vitals:    07/23/21 1437 07/23/21 1735 07/23/21 1745 07/23/21 1821   BP: (!) 147/87 (!) 147/87     BP Location: LUE - Left upper extremity LUE - Left upper extremity     Patient Position: Sitting Sitting     Pulse: 77 77     Resp: 18 16     Temp: 97.8 °F (36.6 °C)      TempSrc: Tympanic      SpO2: 95% 100% 100% 100%       ED Course  Initial Impression: Pt presents to the ED with cough and SOB when he lays down. He has a normal physical exam. While the pt was waiting he had blood work, an EKG, and a CXR completed which were all unremarkable. I recommend that he take Musinex to break up some of the congestion he is experiencing. We also discuss that what he needs is a sleep study to r/o sleep apnea. I will provide him with a referral to a PCP who will be able to set that up for him. The patient was given ED return precautions, including new or worsening symptoms. Pt does not require further care in the ED and agrees with their plan of care. All questions were addressed.      [facility]  Critical Care time spent on this patient outside of billable procedures:  None    Clinical Impression  ED Diagnosis        Final diagnosis    Sinus congestion                    Follow Up:  Lane Lehman MD  [address] [address]  [phone]    Call in 3 days  Please call  for an appt and return to the ED if symptoms worsen. You can try Mucinex for your symptoms       Discharge Medication List as of 7/23/2021  5:44 PM          Pt is discharged to home/self care in stable condition.       I have reviewed the information recorded by the scribe for accuracy and agree with its contents.    ____________________________________________________________________    Denice Navarro acting as a scribe for Dr. Dain Camara.    Dr. Dain Camara  Dictation # [id number]  Scribe: Denice Camara MD  07/23/21 2034     No

## 2022-11-28 ENCOUNTER — EMERGENCY (EMERGENCY)
Facility: HOSPITAL | Age: 87
LOS: 1 days | Discharge: HOME | End: 2022-11-28
Attending: EMERGENCY MEDICINE | Admitting: EMERGENCY MEDICINE
Payer: MEDICARE

## 2022-11-28 VITALS
OXYGEN SATURATION: 97 % | SYSTOLIC BLOOD PRESSURE: 158 MMHG | DIASTOLIC BLOOD PRESSURE: 76 MMHG | HEART RATE: 73 BPM | TEMPERATURE: 98 F | RESPIRATION RATE: 16 BRPM

## 2022-11-28 VITALS
RESPIRATION RATE: 17 BRPM | HEART RATE: 71 BPM | SYSTOLIC BLOOD PRESSURE: 162 MMHG | DIASTOLIC BLOOD PRESSURE: 72 MMHG | OXYGEN SATURATION: 97 % | HEIGHT: 68 IN | TEMPERATURE: 98 F | WEIGHT: 160.06 LBS

## 2022-11-28 PROBLEM — E78.5 HYPERLIPIDEMIA, UNSPECIFIED: Chronic | Status: ACTIVE | Noted: 2020-12-23

## 2022-11-28 LAB
APPEARANCE UR: CLEAR — SIGNIFICANT CHANGE UP
BILIRUB UR-MCNC: NEGATIVE — SIGNIFICANT CHANGE UP
COLOR SPEC: YELLOW — SIGNIFICANT CHANGE UP
DIFF PNL FLD: NEGATIVE — SIGNIFICANT CHANGE UP
GLUCOSE UR QL: NEGATIVE — SIGNIFICANT CHANGE UP
KETONES UR-MCNC: NEGATIVE — SIGNIFICANT CHANGE UP
LEUKOCYTE ESTERASE UR-ACNC: NEGATIVE — SIGNIFICANT CHANGE UP
NITRITE UR-MCNC: NEGATIVE — SIGNIFICANT CHANGE UP
PH UR: 5.5 — SIGNIFICANT CHANGE UP (ref 5–8)
PROT UR-MCNC: NEGATIVE MG/DL — SIGNIFICANT CHANGE UP
SARS-COV-2 RNA SPEC QL NAA+PROBE: NEGATIVE — SIGNIFICANT CHANGE UP
SP GR SPEC: >=1.03 — SIGNIFICANT CHANGE UP (ref 1–1.03)
UROBILINOGEN FLD QL: 0.2 E.U./DL — SIGNIFICANT CHANGE UP

## 2022-11-28 PROCEDURE — 71046 X-RAY EXAM CHEST 2 VIEWS: CPT

## 2022-11-28 PROCEDURE — 71046 X-RAY EXAM CHEST 2 VIEWS: CPT | Mod: 26

## 2022-11-28 PROCEDURE — 99283 EMERGENCY DEPT VISIT LOW MDM: CPT | Mod: 25

## 2022-11-28 PROCEDURE — 87635 SARS-COV-2 COVID-19 AMP PRB: CPT

## 2022-11-28 PROCEDURE — 81003 URINALYSIS AUTO W/O SCOPE: CPT

## 2022-11-28 RX ORDER — ACETAMINOPHEN 500 MG
1000 TABLET ORAL ONCE
Refills: 0 | Status: COMPLETED | OUTPATIENT
Start: 2022-11-28 | End: 2022-11-28

## 2022-11-28 RX ORDER — LIDOCAINE 4 G/100G
1 CREAM TOPICAL
Qty: 7 | Refills: 0
Start: 2022-11-28

## 2022-11-28 RX ORDER — LIDOCAINE 4 G/100G
1 CREAM TOPICAL ONCE
Refills: 0 | Status: COMPLETED | OUTPATIENT
Start: 2022-11-28 | End: 2022-11-28

## 2022-11-28 RX ADMIN — LIDOCAINE 1 PATCH: 4 CREAM TOPICAL at 11:47

## 2022-11-28 RX ADMIN — Medication 1000 MILLIGRAM(S): at 12:31

## 2022-11-28 RX ADMIN — Medication 1000 MILLIGRAM(S): at 11:23

## 2022-11-28 RX ADMIN — LIDOCAINE 1 PATCH: 4 CREAM TOPICAL at 12:36

## 2022-11-28 NOTE — ED PROVIDER NOTE - CARE PROVIDER_API CALL
Eliel Deal  CARDIOVASCULAR DISEASE  [address] [place] [address], [place] [address]  Phone: [phone]  Fax: [phone]  Follow Up Time:

## 2022-11-28 NOTE — ED PROVIDER NOTE - OBJECTIVE STATEMENT
Pt is an 85yo m, h/o hld, dm, bph, who p/w left back pain s/p fall last week. Pt states he fell forward in a twisted position, but denies any direct trauma to back/ ribs, check, abd, head. No loc. Pt was seen at [facility] and told to have back strain, has been taking advil/ alleve with minimal improvement. Pain is worse w/ movement/ changing positions. No neck pain, ha, dizziness, chest pain, abd pain, difficulty breathing, dysuria, hematuria, fever, chills, cough... No n/t/w, saddle anesthesia, bowel/ bladder dysfunction. Not on blood thinners.

## 2022-11-28 NOTE — ED ADULT NURSE NOTE - OBJECTIVE STATEMENT
Pt is 86M [place] c/o fall x5days ago presents to ED ambulatory w cane states pain unrelieved at home. Pain localized to mid lower back. States was in bathroom and lost footing and fell. Denies LOC, denies headstrike, denies numbness/tingling in extremities, denies loss of bowel/bladder control since fall; unsure if takes blood thinners states "I have a whole bag of pills I'm not sure what any of them are." Pt is awake, alert, PEERL.

## 2022-11-28 NOTE — ED PROVIDER NOTE - CLINICAL SUMMARY MEDICAL DECISION MAKING FREE TEXT BOX
Impression: left back pain s/p fall last week with no direct trauma to back. Pain worse w/ movement, non-radiating, no associated n/t/w, bowel/ bladder dysfunction... AVSS. UA neg for infection./ blood. CXR neg for acute rib fx's. ED evaluation and management discussed with the patient in detail. Do not suspect cauda equina/ sea/ hematoma. Close PMD follow up encouraged.  Strict ED return instructions discussed in detail and patient given the opportunity to ask any questions about their discharge diagnosis and instructions. Patient verbalized understanding.

## 2022-11-28 NOTE — ED PROVIDER NOTE - PHYSICAL EXAMINATION
VITAL SIGNS: I have reviewed nursing notes and confirm.  CONSTITUTIONAL: Well appearing, in no acute distress.   SKIN:  warm and dry, no acute rash.   HEAD:  normocephalic, atraumatic.  EYES: EOM intact; conjunctiva and sclera clear.  ENT: No nasal discharge; airway clear.   NECK: Supple.  CARD: S1, S2, Regular rate and rhythm.   RESP:  Clear to auscultation b/l, no wheezes, rales or rhonchi.  ABD: Normal bowel sounds; soft; non-distended; non-tender; no guarding/ rebound.  BACK: No midline c/t/l spine tenderness. + mild ttp to left lower parathoracic back muscles. No hematomas, ecchymosis, crepitus... No cvat.   EXT: Normal ROM. No peripheral edema. Pulses intact and equal b/l.  NEURO: Alert, oriented, grossly unremarkable  PSYCH: Cooperative, mood and affect appropriate.

## 2022-11-28 NOTE — ED ADULT TRIAGE NOTE - CHIEF COMPLAINT QUOTE
Pt c/o lower back/sacral pain after fall x6 days ago. "I lost my footing in an office building and landed on my behind. I went to [facility] and they told me it's musculoskeletal but it's gotta be something else because it's not getting any better." Pt denies any new extremity weakness/numbness. Denies any bladder incontinence, did not hit head when falling. Minimal relief with Aleve. Ambulatory with cane.

## 2022-11-28 NOTE — ED PROVIDER NOTE - NSFOLLOWUPINSTRUCTIONS_ED_ALL_ED_FT
Apply lidocaine patch to your back once daily.  Take tylenol as needed for pain.  Avoid heavy lifting/ straining.  Follow up with your primary care physician for re-evaluation.  Return to er for any new or worsening symptoms.     Back Pain    Back pain is very common in adults. The cause of back pain is rarely dangerous and the pain often gets better over time. The cause of your back pain may not be known and may include strain of muscles or ligaments, degeneration of the spinal disks, or arthritis. Occasionally the pain may radiate down your leg(s). Over-the-counter medicines to reduce pain and inflammation are often the most helpful. Stretching and remaining active frequently helps the healing process.     SEEK IMMEDIATE MEDICAL CARE IF YOU HAVE ANY OF THE FOLLOWING SYMPTOMS: bowel or bladder control problems, unusual weakness or numbness in your arms or legs, nausea or vomiting, abdominal pain, fever, dizziness/lightheadedness.

## 2022-11-28 NOTE — ED PROVIDER NOTE - PATIENT PORTAL LINK FT
You can access the FollowMyHealth Patient Portal offered by [facility] by registering at the following website: [email or web address] By joining [email or web address]s FollowMyHealth portal, you will also be able to view your health information using other applications (apps) compatible with our system.

## 2022-11-28 NOTE — ED ADULT NURSE NOTE - CHIEF COMPLAINT QUOTE
Pt c/o lower back/sacral pain after fall x6 days ago. "I lost my footing in an office building and landed on my behind. I went to [facility] and they told me it's musculoskeletal but it's gotta be something else because it's not getting any better." Pt denies any new extremity weakness/numbness. Denies any bladder incontinence, did not hit head when falling. Minimal relief with Aleve. Ambulatory with cane.

## 2022-12-01 DIAGNOSIS — E78.5 HYPERLIPIDEMIA, UNSPECIFIED: ICD-10-CM

## 2022-12-01 DIAGNOSIS — Z79.84 LONG TERM (CURRENT) USE OF ORAL HYPOGLYCEMIC DRUGS: ICD-10-CM

## 2022-12-01 DIAGNOSIS — Z87.891 PERSONAL HISTORY OF NICOTINE DEPENDENCE: ICD-10-CM

## 2022-12-01 DIAGNOSIS — N40.0 BENIGN PROSTATIC HYPERPLASIA WITHOUT LOWER URINARY TRACT SYMPTOMS: ICD-10-CM

## 2022-12-01 DIAGNOSIS — Z20.822 CONTACT WITH AND (SUSPECTED) EXPOSURE TO COVID-19: ICD-10-CM

## 2022-12-01 DIAGNOSIS — M54.6 PAIN IN THORACIC SPINE: ICD-10-CM

## 2022-12-01 DIAGNOSIS — E11.9 TYPE 2 DIABETES MELLITUS WITHOUT COMPLICATIONS: ICD-10-CM

## 2023-05-01 ENCOUNTER — APPOINTMENT (OUTPATIENT)
Dept: UROLOGY | Facility: CLINIC | Age: 88
End: 2023-05-01

## 2023-12-08 ENCOUNTER — EMERGENCY (EMERGENCY)
Facility: HOSPITAL | Age: 88
LOS: 1 days | Discharge: HOME | End: 2023-12-08
Attending: EMERGENCY MEDICINE | Admitting: EMERGENCY MEDICINE
Payer: MEDICARE

## 2023-12-08 VITALS
SYSTOLIC BLOOD PRESSURE: 155 MMHG | HEART RATE: 62 BPM | DIASTOLIC BLOOD PRESSURE: 71 MMHG | OXYGEN SATURATION: 97 % | TEMPERATURE: 98 F | RESPIRATION RATE: 18 BRPM

## 2023-12-08 VITALS
WEIGHT: 149.91 LBS | SYSTOLIC BLOOD PRESSURE: 152 MMHG | RESPIRATION RATE: 17 BRPM | HEART RATE: 74 BPM | TEMPERATURE: 98 F | OXYGEN SATURATION: 97 % | DIASTOLIC BLOOD PRESSURE: 66 MMHG

## 2023-12-08 PROCEDURE — 99284 EMERGENCY DEPT VISIT MOD MDM: CPT

## 2023-12-08 PROCEDURE — 71101 X-RAY EXAM UNILAT RIBS/CHEST: CPT | Mod: 26,RT

## 2023-12-08 PROCEDURE — 71101 X-RAY EXAM UNILAT RIBS/CHEST: CPT

## 2023-12-08 PROCEDURE — 99283 EMERGENCY DEPT VISIT LOW MDM: CPT | Mod: 25

## 2023-12-08 PROCEDURE — 96372 THER/PROPH/DIAG INJ SC/IM: CPT

## 2023-12-08 RX ORDER — TRAMADOL HYDROCHLORIDE 50 MG/1
0.5 TABLET ORAL
Qty: 15 | Refills: 0
Start: 2023-12-08

## 2023-12-08 RX ORDER — KETOROLAC TROMETHAMINE 30 MG/ML
30 SYRINGE (ML) INJECTION ONCE
Refills: 0 | Status: DISCONTINUED | OUTPATIENT
Start: 2023-12-08 | End: 2023-12-08

## 2023-12-08 RX ORDER — LIDOCAINE 4 G/100G
1 CREAM TOPICAL
Qty: 30 | Refills: 0
Start: 2023-12-08

## 2023-12-08 RX ORDER — LIDOCAINE 4 G/100G
1 CREAM TOPICAL ONCE
Refills: 0 | Status: COMPLETED | OUTPATIENT
Start: 2023-12-08 | End: 2023-12-08

## 2023-12-08 RX ADMIN — Medication 30 MILLIGRAM(S): at 12:59

## 2023-12-08 RX ADMIN — LIDOCAINE 1 PATCH: 4 CREAM TOPICAL at 12:59

## 2023-12-08 NOTE — ED PROVIDER NOTE - CLINICAL SUMMARY MEDICAL DECISION MAKING FREE TEXT BOX
Pt with mechanical fall presenting with right posterior rib pain, no head trauma, VSS, exam + right posterior rib tenderness    Pain control - lido patch, toradol  Xray ribs Pt with mechanical fall presenting with right posterior rib pain, no head trauma, VSS, exam + right posterior rib tenderness    Pain control - lido patch, toradol  Xray ribs    + right 9th acute rib fx, otherwise multiple chronic fractures  Pt's pain well controlled in ED, O2 sat ok  Will dc with pain meds and incentive spirometry  Pt understands dc instructions and return precautions.

## 2023-12-08 NOTE — ED PROVIDER NOTE - NSFOLLOWUPINSTRUCTIONS_ED_ALL_ED_FT
Take pain medicine as prescribed (tramadol).  Use lidocaine patch as indicated.  Return to ED with worsening rib pain, shortness of breath, other concerns.  Otherwise follow up with your PMD.    Rib Fracture    A rib fracture is a break or crack in one of the bones of the ribs. The ribs are long, curved bones that wrap around your chest and attach to your spine and your breastbone. The ribs protect your heart, lungs, and other organs in the chest.    A broken or cracked rib is often painful but is not usually serious. Most rib fractures heal on their own over time. However, rib fractures can be more serious if multiple ribs are broken or if broken ribs move out of place and push against other structures or organs.    What are the causes?  This condition is caused by:  Repetitive movements with high force, such as pitching a baseball or having very bad coughing spells.  A direct hit the chest, such as a sports injury, a car crash, or a fall.  Cancer that has spread to the bones, which can weaken bones and cause them to break.  What are the signs or symptoms?  Symptoms of this condition include:  Pain when you breathe in or cough.  Pain when someone presses on the injured area.  Feeling short of breath.  How is this diagnosed?  This condition is diagnosed with a physical exam and medical history. You may also have imaging tests, such as:  Chest X-ray.  CT scan.  MRI.  Bone scan.  Chest ultrasound.  How is this treated?  Treatment for this condition depends on the severity of the fracture. Most rib fractures usually heal on their own in 1–3 months. Healing may take longer if you have a cough or if you do activities that make the injury worse. While you heal, you may be given medicines to control the pain. You will also be taught deep breathing exercises.    Severe injuries may require hospitalization or surgery.    Follow these instructions at home:  Managing pain, stiffness, and swelling    If directed, put ice on the injured area. To do this:  Put ice in a plastic bag.  Place a towel between your skin and the bag.  Leave the ice on for 20 minutes, 2–3 times a day.  Remove the ice if your skin turns bright red. This is very important. If you cannot feel pain, heat, or cold, you have a greater risk of damage to the area.  Take over-the-counter and prescription medicines only as told by your health care provider.  Activity    Avoid doing activities or movements that cause pain. Be careful during activities and avoid bumping the injured rib.  Slowly increase your activity as told by your health care provider.  General instructions    Do deep breathing exercises as told by your health care provider. This helps prevent pneumonia, which is a common complication of a broken rib. Your health care provider may instruct you to:  Take deep breaths several times a day.  Try to cough several times a day, holding a pillow against the injured area.  Use a device called incentive spirometer to practice deep breathing several times a day.  Drink enough fluid to keep your urine pale yellow.  Do not wear a rib belt or binder. These restrict breathing, which can lead to pneumonia.  Keep all follow-up visits. This is important.  Contact a health care provider if:  You have a fever.  Get help right away if:  You have difficulty breathing or you are short of breath.  You develop a cough that does not stop, or you cough up thick or bloody sputum.  You have nausea, vomiting, or pain in your abdomen.  Your pain gets worse and medicine does not help.  These symptoms may represent a serious problem that is an emergency. Do not wait to see if the symptoms will go away. Get medical help right away. Call your local emergency services (911 in the [place] Do not drive yourself to the hospital.    Summary  A rib fracture is a break or crack in one of the bones of the ribs.  A broken or cracked rib is often painful but is not usually serious.  Most rib fractures heal on their own over time.  Treatment for this condition depends on the severity of the fracture.  Avoid doing any activities or movements that cause pain.  This information is not intended to replace advice given to you by your health care provider. Make sure you discuss any questions you have with your health care provider.    Document Revised: 04/09/2021 Document Reviewed: 04/09/2021  Elsevier Patient Education © 2023 [employer]  Elsevier logo  Terms and Conditions  Privacy Policy  Editorial Policy  All content on this site: Copyright © 2023 Elsevier, its licensors, and contributors. All rights are reserved, including those for text and data mining, AI training, and similar technologies. For all open access content, the Creative Commons licensing terms apply.  Cookies are used by this site. To decline or learn more, visit our Cookies page. Take pain medicine as prescribed (tramadol).  Use lidocaine patch as indicated.  Return to ED with worsening rib pain, shortness of breath, other concerns.  Otherwise follow up with your PMD.    Rib Fracture    A rib fracture is a break or crack in one of the bones of the ribs. The ribs are long, curved bones that wrap around your chest and attach to your spine and your breastbone. The ribs protect your heart, lungs, and other organs in the chest.    A broken or cracked rib is often painful but is not usually serious. Most rib fractures heal on their own over time. However, rib fractures can be more serious if multiple ribs are broken or if broken ribs move out of place and push against other structures or organs.    What are the causes?  This condition is caused by:  Repetitive movements with high force, such as pitching a baseball or having very bad coughing spells.  A direct hit the chest, such as a sports injury, a car crash, or a fall.  Cancer that has spread to the bones, which can weaken bones and cause them to break.  What are the signs or symptoms?  Symptoms of this condition include:  Pain when you breathe in or cough.  Pain when someone presses on the injured area.  Feeling short of breath.  How is this diagnosed?  This condition is diagnosed with a physical exam and medical history. You may also have imaging tests, such as:  Chest X-ray.  CT scan.  MRI.  Bone scan.  Chest ultrasound.  How is this treated?  Treatment for this condition depends on the severity of the fracture. Most rib fractures usually heal on their own in 1–3 months. Healing may take longer if you have a cough or if you do activities that make the injury worse. While you heal, you may be given medicines to control the pain. You will also be taught deep breathing exercises.    Severe injuries may require hospitalization or surgery.    Follow these instructions at home:  Managing pain, stiffness, and swelling    If directed, put ice on the injured area. To do this:  Put ice in a plastic bag.  Place a towel between your skin and the bag.  Leave the ice on for 20 minutes, 2–3 times a day.  Remove the ice if your skin turns bright red. This is very important. If you cannot feel pain, heat, or cold, you have a greater risk of damage to the area.  Take over-the-counter and prescription medicines only as told by your health care provider.  Activity    Avoid doing activities or movements that cause pain. Be careful during activities and avoid bumping the injured rib.  Slowly increase your activity as told by your health care provider.  General instructions    Do deep breathing exercises as told by your health care provider. This helps prevent pneumonia, which is a common complication of a broken rib. Your health care provider may instruct you to:  Take deep breaths several times a day.  Try to cough several times a day, holding a pillow against the injured area.  Use a device called incentive spirometer to practice deep breathing several times a day.  Drink enough fluid to keep your urine pale yellow.  Do not wear a rib belt or binder. These restrict breathing, which can lead to pneumonia.  Keep all follow-up visits. This is important.  Contact a health care provider if:  You have a fever.  Get help right away if:  You have difficulty breathing or you are short of breath.  You develop a cough that does not stop, or you cough up thick or bloody sputum.  You have nausea, vomiting, or pain in your abdomen.  Your pain gets worse and medicine does not help.  These symptoms may represent a serious problem that is an emergency. Do not wait to see if the symptoms will go away. Get medical help right away. Call your local emergency services (911 in the [place] Do not drive yourself to the hospital.    Summary  A rib fracture is a break or crack in one of the bones of the ribs.  A broken or cracked rib is often painful but is not usually serious.  Most rib fractures heal on their own over time.  Treatment for this condition depends on the severity of the fracture.  Avoid doing any activities or movements that cause pain.  This information is not intended to replace advice given to you by your health care provider. Make sure you discuss any questions you have with your health care provider.    Document Revised: 04/09/2021 Document Reviewed: 04/09/2021  Elsevier Patient Education © 2023 [employer]  Elsevier logo  Terms and Conditions  Privacy Policy  Editorial Policy  All content on this site: Copyright © 2023 Elsevier, its licensors, and contributors. All rights are reserved, including those for text and data mining, AI training, and similar technologies. For all open access content, the Creative Commons licensing terms apply.  Cookies are used by this site. To decline or learn more, visit our Cookies page.

## 2023-12-08 NOTE — ED PROVIDER NOTE - RESPIRATORY, MLM
Breath sounds clear and equal bilaterally, + right posterior rib tenderness, no step off, no crepitus, no bruising

## 2023-12-08 NOTE — ED PROVIDER NOTE - PATIENT PORTAL LINK FT
You can access the FollowMyHealth Patient Portal offered by [facility] by registering at the following website: [email or web address]. By joining [employer]’s FollowMyHealth portal, you will also be able to view your health information using other applications (apps) compatible with our system. You can access the FollowMyHealth Patient Portal offered by [facility] by registering at the following website: [email or web address]. By joining [employer]’s FollowMyHealth portal, you will also be able to view your health information using other applications (apps) compatible with our system.

## 2023-12-08 NOTE — ED PROVIDER NOTE - NEUROLOGICAL, MLM
Tele hospitalist 
 
Restlessness and agitation along with primary insomnia. Patient not slept more than few hours each night. Melatonin and ativan prn have not been helpful so far and now he has worsening agitation. Plan Haldol 2 mg IV x 1 Recommend Psychiatrist to recommend something for long term
Alert and oriented, no focal deficits, no motor or sensory deficits.

## 2023-12-08 NOTE — ED ADULT NURSE NOTE - NSFALLRISKINTERV_ED_ALL_ED
Communicate fall risk and risk factors to all staff, patient, and family/Provide visual cue: yellow wristband, yellow gown, etc/Reinforce activity limits and safety measures with patient and family/Call bell, personal items and telephone in reach/Instruct patient to call for assistance before getting out of bed/chair/stretcher/Non-slip footwear applied when patient is off stretcher/[place] to call system/Physically safe environment - no spills, clutter or unnecessary equipment/Purposeful Proactive Rounding/Room/bathroom lighting operational, light cord in reach Communicate fall risk and risk factors to all staff, patient, and family/Provide visual cue: yellow wristband, yellow gown, etc/Reinforce activity limits and safety measures with patient and family/Call bell, personal items and telephone in reach/Instruct patient to call for assistance before getting out of bed/chair/stretcher/Non-slip footwear applied when patient is off stretcher/[place] to call system/Physically safe environment - no spills, clutter or unnecessary equipment/Purposeful Proactive Rounding/Room/bathroom lighting operational, light cord in reach

## 2023-12-08 NOTE — ED ADULT NURSE NOTE - OBJECTIVE STATEMENT
Pt to ED c/o back pain after fall in shower 2 days ago. Pt reports he slipped on mat in shower, fell to R back and hip, denies HS or LOC. No obvious bruising, swelling or deformity. Pt endorses lower R sided back pain rated 6 out of 10 pt scale and muscle stiffness. Denies neck pain, HA, blurry vision, n/v, urinary sx, hematuria, bowel/bladder incontinence. Pt denies CP, SOB, palpitations or dizziness. RR even and unlabored on RA. A&ox4. ambulates w steady gait using a RW. A&ox4. speaking in full sentences. pt not on AC.

## 2023-12-08 NOTE — ED PROVIDER NOTE - OBJECTIVE STATEMENT
87 y/o m with PMH of HTN, BPH, DM presents to ED with mechanical fall two days prior in bathtub.  Pt states he slipped and hit left posterior back/ribs.  Denies shortness of breath.  Denies head trauma, neck pain, lower ext pain or other injury.  Still able to ambulate with walker.  Has been taking Tylenol with some relief of pain.  Pt with hx of chronic old rib fractures.

## 2023-12-08 NOTE — ED ADULT NURSE NOTE - NURSING NEURO LEVEL OF CONSCIOUSNESS
What Is The Reason For Today's Visit?: Full Body Skin Examination What Is The Reason For Today's Visit? (Being Monitored For X): the development of new lesions alert and awake

## 2023-12-08 NOTE — ED ADULT TRIAGE NOTE - CHIEF COMPLAINT QUOTE
Pt c/o back pain s/p fall in bathtub 2 days ago. Denies head strike, numbness/tingling, loss of bowel/bladder control. Pt ambulatory with walker at baseline.

## 2023-12-11 DIAGNOSIS — R07.81 PLEURODYNIA: ICD-10-CM

## 2023-12-11 DIAGNOSIS — E11.9 TYPE 2 DIABETES MELLITUS WITHOUT COMPLICATIONS: ICD-10-CM

## 2023-12-11 DIAGNOSIS — Z79.84 LONG TERM (CURRENT) USE OF ORAL HYPOGLYCEMIC DRUGS: ICD-10-CM

## 2023-12-11 DIAGNOSIS — I10 ESSENTIAL (PRIMARY) HYPERTENSION: ICD-10-CM
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
## 2023-12-11 DIAGNOSIS — Z04.3 ENCOUNTER FOR EXAMINATION AND OBSERVATION FOLLOWING OTHER ACCIDENT: ICD-10-CM

## 2024-02-07 ENCOUNTER — INPATIENT (INPATIENT)
Facility: HOSPITAL | Age: 89
LOS: 0 days | Discharge: HOME HEALTH | DRG: 603 | End: 2024-02-08
Attending: INTERNAL MEDICINE | Admitting: STUDENT IN AN ORGANIZED HEALTH CARE EDUCATION/TRAINING PROGRAM
Payer: COMMERCIAL

## 2024-02-07 VITALS
RESPIRATION RATE: 18 BRPM | SYSTOLIC BLOOD PRESSURE: 180 MMHG | TEMPERATURE: 98 F | OXYGEN SATURATION: 98 % | HEIGHT: 69 IN | WEIGHT: 160.06 LBS | HEART RATE: 58 BPM | DIASTOLIC BLOOD PRESSURE: 95 MMHG

## 2024-02-07 DIAGNOSIS — L03.031 CELLULITIS OF RIGHT TOE: ICD-10-CM

## 2024-02-07 DIAGNOSIS — N40.0 BENIGN PROSTATIC HYPERPLASIA WITHOUT LOWER URINARY TRACT SYMPTOMS: ICD-10-CM

## 2024-02-07 DIAGNOSIS — T14.8XXA OTHER INJURY OF UNSPECIFIED BODY REGION, INITIAL ENCOUNTER: ICD-10-CM

## 2024-02-07 DIAGNOSIS — E11.9 TYPE 2 DIABETES MELLITUS WITHOUT COMPLICATIONS: ICD-10-CM

## 2024-02-07 DIAGNOSIS — E78.5 HYPERLIPIDEMIA, UNSPECIFIED: ICD-10-CM

## 2024-02-07 DIAGNOSIS — Z29.9 ENCOUNTER FOR PROPHYLACTIC MEASURES, UNSPECIFIED: ICD-10-CM

## 2024-02-07 DIAGNOSIS — I10 ESSENTIAL (PRIMARY) HYPERTENSION: ICD-10-CM

## 2024-02-07 LAB
ANION GAP SERPL CALC-SCNC: 9 MMOL/L — SIGNIFICANT CHANGE UP (ref 5–17)
BASOPHILS # BLD AUTO: 0.03 K/UL — SIGNIFICANT CHANGE UP (ref 0–0.2)
BASOPHILS NFR BLD AUTO: 0.4 % — SIGNIFICANT CHANGE UP (ref 0–2)
BUN SERPL-MCNC: 20 MG/DL — SIGNIFICANT CHANGE UP (ref 7–23)
CALCIUM SERPL-MCNC: 9.4 MG/DL — SIGNIFICANT CHANGE UP (ref 8.4–10.5)
CHLORIDE SERPL-SCNC: 103 MMOL/L — SIGNIFICANT CHANGE UP (ref 96–108)
CO2 SERPL-SCNC: 26 MMOL/L — SIGNIFICANT CHANGE UP (ref 22–31)
CREAT SERPL-MCNC: 1.01 MG/DL — SIGNIFICANT CHANGE UP (ref 0.5–1.3)
CRP SERPL-MCNC: <3 MG/L — SIGNIFICANT CHANGE UP (ref 0–4)
EGFR: 72 ML/MIN/1.73M2 — SIGNIFICANT CHANGE UP
EOSINOPHIL # BLD AUTO: 0.14 K/UL — SIGNIFICANT CHANGE UP (ref 0–0.5)
EOSINOPHIL NFR BLD AUTO: 2 % — SIGNIFICANT CHANGE UP (ref 0–6)
ERYTHROCYTE [SEDIMENTATION RATE] IN BLOOD: 16 MM/HR — SIGNIFICANT CHANGE UP
GLUCOSE BLDC GLUCOMTR-MCNC: 112 MG/DL — HIGH (ref 70–99)
GLUCOSE SERPL-MCNC: 106 MG/DL — HIGH (ref 70–99)
GRAM STN FLD: ABNORMAL
HCT VFR BLD CALC: 30.1 % — LOW (ref 39–50)
HGB BLD-MCNC: 10.1 G/DL — LOW (ref 13–17)
IMM GRANULOCYTES NFR BLD AUTO: 0.3 % — SIGNIFICANT CHANGE UP (ref 0–0.9)
LYMPHOCYTES # BLD AUTO: 1.15 K/UL — SIGNIFICANT CHANGE UP (ref 1–3.3)
LYMPHOCYTES # BLD AUTO: 16.7 % — SIGNIFICANT CHANGE UP (ref 13–44)
MCHC RBC-ENTMCNC: 31.8 PG — SIGNIFICANT CHANGE UP (ref 27–34)
MCHC RBC-ENTMCNC: 33.6 GM/DL — SIGNIFICANT CHANGE UP (ref 32–36)
MCV RBC AUTO: 94.7 FL — SIGNIFICANT CHANGE UP (ref 80–100)
MONOCYTES # BLD AUTO: 0.73 K/UL — SIGNIFICANT CHANGE UP (ref 0–0.9)
MONOCYTES NFR BLD AUTO: 10.6 % — SIGNIFICANT CHANGE UP (ref 2–14)
NEUTROPHILS # BLD AUTO: 4.83 K/UL — SIGNIFICANT CHANGE UP (ref 1.8–7.4)
NEUTROPHILS NFR BLD AUTO: 70 % — SIGNIFICANT CHANGE UP (ref 43–77)
NRBC # BLD: 0 /100 WBCS — SIGNIFICANT CHANGE UP (ref 0–0)
PLATELET # BLD AUTO: 242 K/UL — SIGNIFICANT CHANGE UP (ref 150–400)
POTASSIUM SERPL-MCNC: 3.8 MMOL/L — SIGNIFICANT CHANGE UP (ref 3.5–5.3)
POTASSIUM SERPL-SCNC: 3.8 MMOL/L — SIGNIFICANT CHANGE UP (ref 3.5–5.3)
RBC # BLD: 3.18 M/UL — LOW (ref 4.2–5.8)
RBC # FLD: 12.4 % — SIGNIFICANT CHANGE UP (ref 10.3–14.5)
SODIUM SERPL-SCNC: 138 MMOL/L — SIGNIFICANT CHANGE UP (ref 135–145)
SPECIMEN SOURCE: SIGNIFICANT CHANGE UP
WBC # BLD: 6.9 K/UL — SIGNIFICANT CHANGE UP (ref 3.8–10.5)
WBC # FLD AUTO: 6.9 K/UL — SIGNIFICANT CHANGE UP (ref 3.8–10.5)

## 2024-02-07 PROCEDURE — 73630 X-RAY EXAM OF FOOT: CPT | Mod: 26,RT

## 2024-02-07 PROCEDURE — 99285 EMERGENCY DEPT VISIT HI MDM: CPT

## 2024-02-07 PROCEDURE — 71045 X-RAY EXAM CHEST 1 VIEW: CPT | Mod: 26

## 2024-02-07 PROCEDURE — 99223 1ST HOSP IP/OBS HIGH 75: CPT | Mod: GC

## 2024-02-07 PROCEDURE — 93971 EXTREMITY STUDY: CPT | Mod: 26,RT

## 2024-02-07 RX ORDER — PIPERACILLIN AND TAZOBACTAM 4; .5 G/20ML; G/20ML
3.38 INJECTION, POWDER, LYOPHILIZED, FOR SOLUTION INTRAVENOUS EVERY 8 HOURS
Refills: 0 | Status: DISCONTINUED | OUTPATIENT
Start: 2024-02-08 | End: 2024-02-08

## 2024-02-07 RX ORDER — ACETAMINOPHEN 500 MG
975 TABLET ORAL EVERY 8 HOURS
Refills: 0 | Status: DISCONTINUED | OUTPATIENT
Start: 2024-02-07 | End: 2024-02-08

## 2024-02-07 RX ORDER — INSULIN LISPRO 100/ML
VIAL (ML) SUBCUTANEOUS AT BEDTIME
Refills: 0 | Status: DISCONTINUED | OUTPATIENT
Start: 2024-02-07 | End: 2024-02-08

## 2024-02-07 RX ORDER — VANCOMYCIN HCL 1 G
1000 VIAL (EA) INTRAVENOUS EVERY 12 HOURS
Refills: 0 | Status: DISCONTINUED | OUTPATIENT
Start: 2024-02-08 | End: 2024-02-08

## 2024-02-07 RX ORDER — ACETAMINOPHEN 500 MG
650 TABLET ORAL EVERY 6 HOURS
Refills: 0 | Status: DISCONTINUED | OUTPATIENT
Start: 2024-02-07 | End: 2024-02-07

## 2024-02-07 RX ORDER — TAMSULOSIN HYDROCHLORIDE 0.4 MG/1
0.4 CAPSULE ORAL AT BEDTIME
Refills: 0 | Status: DISCONTINUED | OUTPATIENT
Start: 2024-02-07 | End: 2024-02-08

## 2024-02-07 RX ORDER — LOSARTAN POTASSIUM 100 MG/1
25 TABLET, FILM COATED ORAL ONCE
Refills: 0 | Status: COMPLETED | OUTPATIENT
Start: 2024-02-07 | End: 2024-02-07

## 2024-02-07 RX ORDER — FENOFIBRATE,MICRONIZED 130 MG
1 CAPSULE ORAL
Qty: 0 | Refills: 0 | DISCHARGE

## 2024-02-07 RX ORDER — SOLIFENACIN SUCCINATE 10 MG/1
1 TABLET ORAL
Qty: 0 | Refills: 0 | DISCHARGE

## 2024-02-07 RX ORDER — SODIUM CHLORIDE 9 MG/ML
1000 INJECTION, SOLUTION INTRAVENOUS
Refills: 0 | Status: DISCONTINUED | OUTPATIENT
Start: 2024-02-07 | End: 2024-02-08

## 2024-02-07 RX ORDER — DEXTROSE 50 % IN WATER 50 %
12.5 SYRINGE (ML) INTRAVENOUS ONCE
Refills: 0 | Status: DISCONTINUED | OUTPATIENT
Start: 2024-02-07 | End: 2024-02-08

## 2024-02-07 RX ORDER — KETOROLAC TROMETHAMINE 30 MG/ML
15 SYRINGE (ML) INJECTION EVERY 8 HOURS
Refills: 0 | Status: DISCONTINUED | OUTPATIENT
Start: 2024-02-07 | End: 2024-02-08

## 2024-02-07 RX ORDER — PIPERACILLIN AND TAZOBACTAM 4; .5 G/20ML; G/20ML
3.38 INJECTION, POWDER, LYOPHILIZED, FOR SOLUTION INTRAVENOUS ONCE
Refills: 0 | Status: COMPLETED | OUTPATIENT
Start: 2024-02-07 | End: 2024-02-07

## 2024-02-07 RX ORDER — DEXTROSE 50 % IN WATER 50 %
25 SYRINGE (ML) INTRAVENOUS ONCE
Refills: 0 | Status: DISCONTINUED | OUTPATIENT
Start: 2024-02-07 | End: 2024-02-08

## 2024-02-07 RX ORDER — ENOXAPARIN SODIUM 100 MG/ML
40 INJECTION SUBCUTANEOUS EVERY 24 HOURS
Refills: 0 | Status: DISCONTINUED | OUTPATIENT
Start: 2024-02-08 | End: 2024-02-08

## 2024-02-07 RX ORDER — DEXTROSE 50 % IN WATER 50 %
15 SYRINGE (ML) INTRAVENOUS ONCE
Refills: 0 | Status: DISCONTINUED | OUTPATIENT
Start: 2024-02-07 | End: 2024-02-08

## 2024-02-07 RX ORDER — LOSARTAN POTASSIUM 100 MG/1
25 TABLET, FILM COATED ORAL EVERY 24 HOURS
Refills: 0 | Status: DISCONTINUED | OUTPATIENT
Start: 2024-02-08 | End: 2024-02-08

## 2024-02-07 RX ORDER — VANCOMYCIN HCL 1 G
1000 VIAL (EA) INTRAVENOUS ONCE
Refills: 0 | Status: COMPLETED | OUTPATIENT
Start: 2024-02-07 | End: 2024-02-07

## 2024-02-07 RX ORDER — LANOLIN ALCOHOL/MO/W.PET/CERES
5 CREAM (GRAM) TOPICAL AT BEDTIME
Refills: 0 | Status: DISCONTINUED | OUTPATIENT
Start: 2024-02-07 | End: 2024-02-08

## 2024-02-07 RX ORDER — GLUCAGON INJECTION, SOLUTION 0.5 MG/.1ML
1 INJECTION, SOLUTION SUBCUTANEOUS ONCE
Refills: 0 | Status: DISCONTINUED | OUTPATIENT
Start: 2024-02-07 | End: 2024-02-08

## 2024-02-07 RX ORDER — METFORMIN HYDROCHLORIDE 850 MG/1
1 TABLET ORAL
Qty: 0 | Refills: 0 | DISCHARGE

## 2024-02-07 RX ORDER — FENOFIBRATE,MICRONIZED 130 MG
145 CAPSULE ORAL EVERY 24 HOURS
Refills: 0 | Status: DISCONTINUED | OUTPATIENT
Start: 2024-02-08 | End: 2024-02-08

## 2024-02-07 RX ORDER — INSULIN LISPRO 100/ML
VIAL (ML) SUBCUTANEOUS
Refills: 0 | Status: DISCONTINUED | OUTPATIENT
Start: 2024-02-07 | End: 2024-02-08

## 2024-02-07 RX ORDER — TAMSULOSIN HYDROCHLORIDE 0.4 MG/1
1 CAPSULE ORAL
Qty: 0 | Refills: 0 | DISCHARGE

## 2024-02-07 RX ADMIN — PIPERACILLIN AND TAZOBACTAM 200 GRAM(S): 4; .5 INJECTION, POWDER, LYOPHILIZED, FOR SOLUTION INTRAVENOUS at 20:00

## 2024-02-07 RX ADMIN — LOSARTAN POTASSIUM 25 MILLIGRAM(S): 100 TABLET, FILM COATED ORAL at 17:16

## 2024-02-07 RX ADMIN — Medication 15 MILLIGRAM(S): at 23:46

## 2024-02-07 RX ADMIN — Medication 15 MILLIGRAM(S): at 22:46

## 2024-02-07 RX ADMIN — Medication 250 MILLIGRAM(S): at 14:01

## 2024-02-07 RX ADMIN — PIPERACILLIN AND TAZOBACTAM 200 GRAM(S): 4; .5 INJECTION, POWDER, LYOPHILIZED, FOR SOLUTION INTRAVENOUS at 13:02

## 2024-02-07 RX ADMIN — Medication 5 MILLIGRAM(S): at 22:46

## 2024-02-07 RX ADMIN — Medication 1 MILLIGRAM(S): at 16:29

## 2024-02-07 NOTE — ED PROVIDER NOTE - OBJECTIVE STATEMENT
88M with PMH of HTN, BPH, and DM who was sent in from [facility] for right great toe pain swelling and redness. Pt states he tripped and fell about 1 week ago where he banged his big and second toe and developed redness and swelling about two days ago to his big toe. Per daughter he was recently on Augmentin for a different toe infection. No f/c, no cp/sob. 88M with PMH of HTN, BPH, and DM who was sent in from [facility] for right great toe pain swelling and redness. Pt states he tripped and fell about 1 week ago where he banged his big and second toe and developed redness and swelling about two days ago to his big toe. Per daughter he was recently on Augmentin for a different toe infection. No f/c, no cp/sob, no other complaints at this time.

## 2024-02-07 NOTE — H&P ADULT - PROBLEM SELECTOR PLAN 2
Patient w/ PMHx of HTN but no current BP meds. Agitated in ED w/ BP rising to 190. Patient currently denying pain, urinated around 600cc of urine recently.   - start Losartan 25mg qd and increase as tolerated  - f/u bladder scan Patient w/ PMHx of HTN but no current BP meds. Agitated in ED w/ BP rising to 190. Patient currently denying pain, urinated around 600cc of urine recently.   - start Losartan 25mg qd and increase as tolerated  - f/u bladder scan and PVR

## 2024-02-07 NOTE — ED ADULT NURSE NOTE - CHIEF COMPLAINT QUOTE
pt coming from assisted living [facility] for ? cellulitis of R toe. c/o R toe pain x 2 days with redness and swelling. Hx of DM.

## 2024-02-07 NOTE — PROCEDURE NOTE - GENERAL PROCEDURE DETAILS
Area prepped with betadine. Using hemostat, removed remaining portions of attached hallucal nail from nail bed. Using hemostat, removed hallucal nail. Hemorrhagic blister lanced with removal of nail. Serosanginous drainage noted from blister lanced. Removed overlying macerated tissue. Copiously irrigated with betadine-saline flushes. Dressed hallucal nail bed with Telfa, gauze, and abdias.

## 2024-02-07 NOTE — H&P ADULT - NSHPPHYSICALEXAM_GEN_ALL_CORE
VITAL SIGNS:  T(C): 36.6 (02-07-24 @ 17:02), Max: 36.6 (02-07-24 @ 15:38)  T(F): 97.8 (02-07-24 @ 17:02), Max: 97.9 (02-07-24 @ 15:38)  HR: 67 (02-07-24 @ 17:02) (58 - 69)  BP: 180/97 (02-07-24 @ 17:02) (180/95 - 191/91)  BP(mean): --  RR: 18 (02-07-24 @ 17:02) (18 - 18)  SpO2: 100% (02-07-24 @ 17:02) (98% - 100%)  Wt(kg): --    PHYSICAL EXAM:  Constitutional: NAD;  Head: NC/AT  Eyes: PERRL, EOMI, anicteric sclera  ENT: no nasal discharge;  Neck: supple;   Respiratory: CTA B/L;  Cardiac: +S1/S2; RRR; no M/R/G;   Gastrointestinal: abdomen soft, non-tender, mild distension;  Extremities: WWP, Right hallux with hematoma beneath nail w/ surrounding erythema to MTP joint, no purulence noted, wrapped w/ gauze and kerlix in surgical boot;  Vascular: 2+ radial, DP pulses B/L  Neurologic: AAOx2 to self and time;

## 2024-02-07 NOTE — PATIENT PROFILE ADULT - FALL HARM RISK - HARM RISK INTERVENTIONS
Assistance with ambulation/Assistance OOB with selected safe patient handling equipment/Communicate Risk of Fall with Harm to all staff/Discuss with provider need for PT consult/Monitor for mental status changes/Monitor gait and stability/Move patient closer to nurses' station/Provide patient with walking aids - walker, cane, crutches/Reinforce activity limits and safety measures with patient and family/Reorient to person, place and time as needed/Tailored Fall Risk Interventions/Toileting schedule using arm’s reach rule for commode and bathroom/Use of alarms - bed, chair and/or voice tab/Visual Cue: Yellow wristband and red socks/Bed in lowest position, wheels locked, appropriate side rails in place/Call bell, personal items and telephone in reach/Instruct patient to call for assistance before getting out of bed or chair/Non-slip footwear when patient is out of bed/[place] to call system/Physically safe environment - no spills, clutter or unnecessary equipment/Purposeful Proactive Rounding/Room/bathroom lighting operational, light cord in reach

## 2024-02-07 NOTE — ED ADULT NURSE NOTE - NSFALLHARMRISKINTERV_ED_ALL_ED

## 2024-02-07 NOTE — ED ADULT NURSE NOTE - DOES PATIENT HAVE ADVANCE DIRECTIVE
pt states she was admitted a few weeks ago and left AMA, came back today because someone she is living with wanted her to be checked out No

## 2024-02-07 NOTE — ED ADULT NURSE NOTE - OBJECTIVE STATEMENT
88yM presents to ER complaining of right toe pain/swelling/redness "for the past few days". Pt states toe only hurts when he walks, "my balance has been off because of this". Denies F/C, Cp/SOB, N/V/D.

## 2024-02-07 NOTE — ED ADULT NURSE NOTE - EXTENSIONS OF SELF_ADULT
Problem: Adult Inpatient Plan of Care  Goal: Plan of Care Review  Outcome: Ongoing, Progressing  Flowsheets (Taken 1/5/2023 3135)  Progress: no change  Plan of Care Reviewed With: patient  Outcome Evaluation: vss   Goal Outcome Evaluation:  Plan of Care Reviewed With: patient        Progress: no change  Outcome Evaluation: vss   None

## 2024-02-07 NOTE — H&P ADULT - PROBLEM SELECTOR PLAN 3
Home med: Metformin 500mg qd  - start mISS w/ meals and at bedtime  - f/u A1c  - carb consistent diet

## 2024-02-07 NOTE — ED PROVIDER NOTE - PHYSICAL EXAMINATION
GEN: Well appearing, well developed, awake, alert, oriented to person, place, time/situation and in no apparent distress. NTAF  ENT: Airway patent, Nasal mucosa clear. Mouth with normal mucosa.  EYES: Clear bilaterally. PERRL, EOMI  RESPIRATORY: Breathing comfortably with normal RR. No W/C/R, no hypoxia or resp distress.  CARDIAC: Regular rate and rhythm, no M/R/G  ABDOMEN: Soft, nontender, +bowel sounds, no rebound, rigidity, or guarding.  MSK: NVI distally with palpated DP/PT, erythema and edema to right great toe, +TTP.  Skin: Hemorrhagic blister over dorsal hallux IPJ, great toe cellulitis with warmth and erythema extending proximally to foot.   NEURO: Alert and oriented, no focal deficits.  PSYCH: Alert and oriented to person, place, time/situation. normal mood and affect. no apparent risk to self or others.

## 2024-02-07 NOTE — ED ADULT NURSE REASSESSMENT NOTE - NS ED NURSE REASSESS COMMENT FT1
Pt placed on a 1:1 for fall risk. Pt became increasingly agitated, attempted to get out of bed and push staff members out of the way. MD Miller at bedside, 1mg of ativan given.

## 2024-02-07 NOTE — H&P ADULT - ATTENDING COMMENTS
88-year-old male with a PMHx of HTN, BPH and DMII who presented with right great toe pain, swelling and erythema, found to have diabetic foot infection.      Plan:    -xray without evidence of acute OM, inflammatory markers negative and without leukocytosis    -podiatry consulted, s/p hematoma evacuation    -continue with empiric Vancomycin and Zosyn, follow up culture data and adjust accordingly    -follow up BCx and Wound Cx    -follow up A1c   -PT consult      Rest of plan as per resident note.

## 2024-02-07 NOTE — H&P ADULT - PROBLEM SELECTOR PLAN 4
Home med: tamsulosin 0.4mg qhs  - c/w tamsulosin 0.4mg qhs Home med: tamsulosin 0.4mg qhs  - c/w tamsulosin 0.4mg qhs  - strict I and Os

## 2024-02-07 NOTE — ED ADULT NURSE NOTE - NS ED NURSE LEVEL OF CONSCIOUSNESS AFFECT
Calm Treatment Number (Optional): 1 Was The Medication Purchased By The Clinic?: No Expiration Date (Optional): 05/2023 Ndc (Starter Kit): [address]-44 J-Code:  Lot # (Optional): [id number] Detail Level: None Syringe Size Used (Required For Enhanced Ndc): Cimzia 200mg/ml Prefilled Syringe Render If Medication Purchased By Clinic In Visit Note?: Yes Additional Comments: Sample medication provided to the patient. Ndc (200mg/Ml Prefilled Syringe): 99416-53835-5 Consent: The risks of pain and injection site reactions were reviewed with the patient prior to the injection. Administered By (Optional): NANCY and ADRIAN Frances Cimzia Amount: 400 mg

## 2024-02-07 NOTE — H&P ADULT - HISTORY OF PRESENT ILLNESS
In the ED:  Initial vital signs: /95, HR 58, RR 18, Temp 97.7 degrees F, Sp02 98% on room air  Labs significant for: hgb 10.1, glucose 106, CRP <3, ESR 16  Imaging:  XRay right foot: There is no fracture or dislocation. There is no radiographic evidence for acute for myelitis. However, there is a small amount of air within the soft tissues overlying the distal phalanx, possibly in keeping with   communication with overlying wound although correlation for sequelae of infection with gas-forming organism may be of utility. There are scattered degenerative changes.  CXR: no acute consolidations  Interventions: Zosyn 3.375 gx1. Vanc 1g x1, due to agitation was given Ativan 1mg x1 89 yo M with PMHx of HTN, BPH, and DM who presents for right great toe swelling and redness. He reports that he tripped and fell around 1 week ago and banged his big toe. His big toe then became red and swollen, worsening over the past few days. Per daughter he was recently on Augmentin for a different toe infection. He has been able to ambulate independently but does have some pain when walking. He lives at [facility] living [facility] Denies fever, chills, sob, chest pain, nausea, vomiting, and diarrhea.       In the ED:  Initial vital signs: /95, HR 58, RR 18, Temp 97.7 degrees F, Sp02 98% on room air  Labs significant for: hgb 10.1, glucose 106, CRP <3, ESR 16  Imaging:  XRay right foot: There is no fracture or dislocation. There is no radiographic evidence for acute for myelitis. However, there is a small amount of air within the soft tissues overlying the distal phalanx, possibly in keeping with   communication with overlying wound although correlation for sequelae of infection with gas-forming organism may be of utility. There are scattered degenerative changes.  CXR: no acute consolidations  Interventions: Zosyn 3.375 gx1. Vanc 1g x1, due to agitation was given Ativan 1mg x1  Consults: podiatry who recommended admission for IV antibiotics 87 yo M with PMHx of HTN, BPH, and DM who presents for right great toe swelling and redness. He reports that he tripped and fell around 1 week ago and banged his big toe. His big toe then became red and swollen, worsening over the past few days. Per daughter he was recently on Augmentin for a different toe infection. He has been able to ambulate independently but does have some pain when walking. He lives at [facility] living [facility]. Denies other complaints, fever, chills, sob, chest pain, nausea, vomiting, and diarrhea.     In the ED:  Initial vital signs: /95, HR 58, RR 18, Temp 97.7 degrees F, Sp02 98% on room air  Labs significant for: hgb 10.1, glucose 106, CRP <3, ESR 16  Imaging:  XRay right foot: There is no fracture or dislocation. There is no radiographic evidence for acute for myelitis. However, there is a small amount of air within the soft tissues overlying the distal phalanx, possibly in keeping with   communication with overlying wound although correlation for sequelae of infection with gas-forming organism may be of utility. There are scattered degenerative changes.  CXR: no acute consolidations  Interventions: Zosyn 3.375 gx1. Vanc 1g x1, due to agitation was given Ativan 1mg x1  Consults: podiatry who recommended admission for IV antibiotics

## 2024-02-07 NOTE — H&P ADULT - NSHPSOCIALHISTORY_GEN_ALL_CORE
Lives at [facility]  Denies alcohol, tobacco, and drug use. Lives at [facility].  Denies alcohol, tobacco, and drug use.

## 2024-02-07 NOTE — H&P ADULT - NSHPLABSRESULTS_GEN_ALL_CORE
LABS:                         10.1   6.90  )-----------( 242      ( 07 Feb 2024 11:07 )             30.1     02-07    138  |  103  |  20  ----------------------------<  106<H>  3.8   |  26  |  1.01    Ca    9.4      07 Feb 2024 11:07        Urinalysis Basic - ( 07 Feb 2024 11:07 )    Color: x / Appearance: x / SG: x / pH: x  Gluc: 106 mg/dL / Ketone: x  / Bili: x / Urobili: x   Blood: x / Protein: x / Nitrite: x   Leuk Esterase: x / RBC: x / WBC x   Sq Epi: x / Non Sq Epi: x / Bacteria: x            RADIOLOGY, EKG & ADDITIONAL TESTS: Reviewed.

## 2024-02-07 NOTE — CONSULT NOTE ADULT - ASSESSMENT
87 y/o M with PMH of HTN, BPH, and DM presents for right hallux pain and cellulitis following trauma ~ 1 week ago. At time of consult, VSS, WBC 6.9, ESR 16, CRP <3. Pt with hemorrhagic blister over dorsal IPJ. X-rays negative for fx with signs of air in area of blister opening.     Plan:    87 y/o M with PMH of HTN, BPH, and DM presents for right hallux pain and cellulitis following trauma ~ 1 week ago. At time of consult, VSS, WBC 6.9, ESR 16, CRP <3. Pt with hemorrhagic blister over dorsal IPJ. X-rays negative for fx with signs of air in area of blister opening.     Plan:   - Nail avulsion and hematoma evacuation performed see procedure note for details.   - Recommend pt be admitted to medicine for IV abx and monitoring of cellulitis.   - Pt refusing admission at this time. If pt continues to refuse, discharge patient with Bactrim/Keflex and f/u with Dr. Corby Jackson in 1 week.   -  Instructed pt to keep dressing Clean, dry, and intact until follow up appointment  - Educated pt on return pre-cautions and instructed pt to come to the ED if he notices any signs of infection  - Pt can WBAT in a surgical shoe  - X-rays reviewed and d/w pt  - rest of care per primary team     Patient should follow up with Dr. Corby Jackson within 1 week of discharge.    Office information:          [place] Address- [address]. [address], [place] [address] Phone: [phone]

## 2024-02-07 NOTE — H&P ADULT - ASSESSMENT
89 yo M with PMHx of HTN, BPH, and DM who presents for right great toe swelling and redness, found to have right hallux hematoma and cellulitis, admitted to medicine for further management.

## 2024-02-07 NOTE — ED PROVIDER NOTE - CLINICAL SUMMARY MEDICAL DECISION MAKING FREE TEXT BOX
88M with PMH of HTN, BPH, and DM who was sent in from [facility] for right great toe pain swelling and redness. Pt states he tripped and fell about 1 week ago where he banged his big and second toe and developed redness and swelling about two days ago to his big toe. Per daughter he was recently on Augmentin for a different toe infection. No f/c, no cp/sob, no other complaints at this time.   On arrival to the ER, pt noted to be HTN've - will recheck- pt denies CP/SOB or sx other that R great toe pain. Afebrile.   plan for labs including BCx and XR foot and podiatry consult.   Labs with no emergent findings, XR no fracture. IV vanc/zosyn given. pt seen by podiatry who recommends admit to medicine for IV abx. D/w wife and [facility] rehab. Pt initially refusing admission however after speaking to his daughter he is now agreeable.   Stable for admission to [facility].

## 2024-02-07 NOTE — H&P ADULT - PROBLEM SELECTOR PLAN 1
#Right hallux cellulitis  Patient w/ right toe pain, swelling, and erythema after mechanical trip/fall last week. Right hallux with hematoma formation on toe. Seen by podiatry in ED.  - s/p Right hallux nail avulsion with hematoma evacuation with podiatry    Plan:  - f/u wound culture from right hallux subungual hematoma  - IV Vanc 1g Q8   - WBAT for right foot in surgical shoe   - f/u podiatry recs  - f/u blood cultures  - RLE doppler  - pain control w/ tylenol 650mg Q6hrs standing #Right hallux cellulitis  Patient w/ right toe pain, swelling, and erythema after mechanical trip/fall last week. Right hallux with hematoma formation on toe. Seen by podiatry in ED.  - s/p Right hallux nail avulsion with hematoma evacuation with podiatry  - XRay right foot: small amount of air within the soft tissues overlying the distal phalanx    Plan:  - f/u wound culture from right hallux subungual hematoma  - IV Vanc 1g Q8   - WBAT for right foot in surgical shoe   - f/u podiatry recs  - f/u blood cultures  - RLE doppler  - pain control w/ tylenol 650mg Q6hrs standing #Right hallux cellulitis  Patient w/ right toe pain, swelling, and erythema after mechanical trip/fall last week. Right hallux with hematoma formation on toe. Seen by podiatry in ED.  - s/p Right hallux nail avulsion with hematoma evacuation with podiatry  - XRay right foot: small amount of air within the soft tissues overlying the distal phalanx    Plan:  - f/u wound culture from right hallux subungual hematoma  - Start IV Vanc 1g Q8 and Zosyn 3.375g Q8hrs for broad-spectrum coverage  - WBAT for right foot in surgical shoe   - f/u podiatry recs  - f/u blood cultures  - RLE doppler  - pain control w/ tylenol 975mg Q8hrs

## 2024-02-07 NOTE — ED ADULT TRIAGE NOTE - CHIEF COMPLAINT QUOTE
pt coming from assisted living [facility] for ? cellulitis of R toe. c/o R toe pain x 2 days with redness and swelling. Hx of DM.

## 2024-02-08 ENCOUNTER — TRANSCRIPTION ENCOUNTER (OUTPATIENT)
Age: 89
End: 2024-02-08

## 2024-02-08 VITALS
TEMPERATURE: 98 F | HEART RATE: 66 BPM | OXYGEN SATURATION: 98 % | RESPIRATION RATE: 19 BRPM | SYSTOLIC BLOOD PRESSURE: 183 MMHG | DIASTOLIC BLOOD PRESSURE: 82 MMHG

## 2024-02-08 LAB
A1C WITH ESTIMATED AVERAGE GLUCOSE RESULT: 6.2 % — HIGH (ref 4–5.6)
ALBUMIN SERPL ELPH-MCNC: 3.6 G/DL — SIGNIFICANT CHANGE UP (ref 3.3–5)
ALP SERPL-CCNC: 42 U/L — SIGNIFICANT CHANGE UP (ref 40–120)
ALT FLD-CCNC: 21 U/L — SIGNIFICANT CHANGE UP (ref 10–45)
ANION GAP SERPL CALC-SCNC: 11 MMOL/L — SIGNIFICANT CHANGE UP (ref 5–17)
AST SERPL-CCNC: 23 U/L — SIGNIFICANT CHANGE UP (ref 10–40)
BASOPHILS # BLD AUTO: 0.03 K/UL — SIGNIFICANT CHANGE UP (ref 0–0.2)
BASOPHILS NFR BLD AUTO: 0.4 % — SIGNIFICANT CHANGE UP (ref 0–2)
BILIRUB SERPL-MCNC: 0.6 MG/DL — SIGNIFICANT CHANGE UP (ref 0.2–1.2)
BUN SERPL-MCNC: 17 MG/DL — SIGNIFICANT CHANGE UP (ref 7–23)
CALCIUM SERPL-MCNC: 9.3 MG/DL — SIGNIFICANT CHANGE UP (ref 8.4–10.5)
CHLORIDE SERPL-SCNC: 98 MMOL/L — SIGNIFICANT CHANGE UP (ref 96–108)
CO2 SERPL-SCNC: 25 MMOL/L — SIGNIFICANT CHANGE UP (ref 22–31)
CREAT SERPL-MCNC: 0.96 MG/DL — SIGNIFICANT CHANGE UP (ref 0.5–1.3)
EGFR: 76 ML/MIN/1.73M2 — SIGNIFICANT CHANGE UP
EOSINOPHIL # BLD AUTO: 0.16 K/UL — SIGNIFICANT CHANGE UP (ref 0–0.5)
EOSINOPHIL NFR BLD AUTO: 2.1 % — SIGNIFICANT CHANGE UP (ref 0–6)
ESTIMATED AVERAGE GLUCOSE: 131 MG/DL — HIGH (ref 68–114)
GLUCOSE BLDC GLUCOMTR-MCNC: 113 MG/DL — HIGH (ref 70–99)
GLUCOSE BLDC GLUCOMTR-MCNC: 133 MG/DL — HIGH (ref 70–99)
GLUCOSE BLDC GLUCOMTR-MCNC: 137 MG/DL — HIGH (ref 70–99)
GLUCOSE BLDC GLUCOMTR-MCNC: 149 MG/DL — HIGH (ref 70–99)
GLUCOSE SERPL-MCNC: 148 MG/DL — HIGH (ref 70–99)
HCT VFR BLD CALC: 32.3 % — LOW (ref 39–50)
HGB BLD-MCNC: 10.9 G/DL — LOW (ref 13–17)
IMM GRANULOCYTES NFR BLD AUTO: 0.1 % — SIGNIFICANT CHANGE UP (ref 0–0.9)
LYMPHOCYTES # BLD AUTO: 1.57 K/UL — SIGNIFICANT CHANGE UP (ref 1–3.3)
LYMPHOCYTES # BLD AUTO: 20.7 % — SIGNIFICANT CHANGE UP (ref 13–44)
MAGNESIUM SERPL-MCNC: 1.8 MG/DL — SIGNIFICANT CHANGE UP (ref 1.6–2.6)
MCHC RBC-ENTMCNC: 32.2 PG — SIGNIFICANT CHANGE UP (ref 27–34)
MCHC RBC-ENTMCNC: 33.7 GM/DL — SIGNIFICANT CHANGE UP (ref 32–36)
MCV RBC AUTO: 95.3 FL — SIGNIFICANT CHANGE UP (ref 80–100)
MONOCYTES # BLD AUTO: 0.73 K/UL — SIGNIFICANT CHANGE UP (ref 0–0.9)
MONOCYTES NFR BLD AUTO: 9.6 % — SIGNIFICANT CHANGE UP (ref 2–14)
NEUTROPHILS # BLD AUTO: 5.07 K/UL — SIGNIFICANT CHANGE UP (ref 1.8–7.4)
NEUTROPHILS NFR BLD AUTO: 67.1 % — SIGNIFICANT CHANGE UP (ref 43–77)
NRBC # BLD: 0 /100 WBCS — SIGNIFICANT CHANGE UP (ref 0–0)
PHOSPHATE SERPL-MCNC: 3.2 MG/DL — SIGNIFICANT CHANGE UP (ref 2.5–4.5)
PLATELET # BLD AUTO: 273 K/UL — SIGNIFICANT CHANGE UP (ref 150–400)
POTASSIUM SERPL-MCNC: 3.5 MMOL/L — SIGNIFICANT CHANGE UP (ref 3.5–5.3)
POTASSIUM SERPL-SCNC: 3.5 MMOL/L — SIGNIFICANT CHANGE UP (ref 3.5–5.3)
PROT SERPL-MCNC: 6.1 G/DL — SIGNIFICANT CHANGE UP (ref 6–8.3)
RBC # BLD: 3.39 M/UL — LOW (ref 4.2–5.8)
RBC # FLD: 12.3 % — SIGNIFICANT CHANGE UP (ref 10.3–14.5)
SODIUM SERPL-SCNC: 134 MMOL/L — LOW (ref 135–145)
WBC # BLD: 7.57 K/UL — SIGNIFICANT CHANGE UP (ref 3.8–10.5)
WBC # FLD AUTO: 7.57 K/UL — SIGNIFICANT CHANGE UP (ref 3.8–10.5)

## 2024-02-08 PROCEDURE — 36415 COLL VENOUS BLD VENIPUNCTURE: CPT

## 2024-02-08 PROCEDURE — 71045 X-RAY EXAM CHEST 1 VIEW: CPT

## 2024-02-08 PROCEDURE — 80048 BASIC METABOLIC PNL TOTAL CA: CPT

## 2024-02-08 PROCEDURE — 96375 TX/PRO/DX INJ NEW DRUG ADDON: CPT

## 2024-02-08 PROCEDURE — 87040 BLOOD CULTURE FOR BACTERIA: CPT

## 2024-02-08 PROCEDURE — 85025 COMPLETE CBC W/AUTO DIFF WBC: CPT

## 2024-02-08 PROCEDURE — 11730 AVULSION NAIL PLATE SIMPLE 1: CPT

## 2024-02-08 PROCEDURE — 99239 HOSP IP/OBS DSCHRG MGMT >30: CPT

## 2024-02-08 PROCEDURE — 83735 ASSAY OF MAGNESIUM: CPT

## 2024-02-08 PROCEDURE — 87070 CULTURE OTHR SPECIMN AEROBIC: CPT

## 2024-02-08 PROCEDURE — 73630 X-RAY EXAM OF FOOT: CPT

## 2024-02-08 PROCEDURE — 84100 ASSAY OF PHOSPHORUS: CPT

## 2024-02-08 PROCEDURE — 80053 COMPREHEN METABOLIC PANEL: CPT

## 2024-02-08 PROCEDURE — 96374 THER/PROPH/DIAG INJ IV PUSH: CPT

## 2024-02-08 PROCEDURE — 97161 PT EVAL LOW COMPLEX 20 MIN: CPT

## 2024-02-08 PROCEDURE — 83036 HEMOGLOBIN GLYCOSYLATED A1C: CPT

## 2024-02-08 PROCEDURE — 86140 C-REACTIVE PROTEIN: CPT

## 2024-02-08 PROCEDURE — 82962 GLUCOSE BLOOD TEST: CPT

## 2024-02-08 PROCEDURE — 85652 RBC SED RATE AUTOMATED: CPT

## 2024-02-08 PROCEDURE — 99285 EMERGENCY DEPT VISIT HI MDM: CPT | Mod: 25

## 2024-02-08 PROCEDURE — 93971 EXTREMITY STUDY: CPT

## 2024-02-08 PROCEDURE — 10140 I&D HMTMA SEROMA/FLUID COLLJ: CPT

## 2024-02-08 PROCEDURE — 87186 SC STD MICRODIL/AGAR DIL: CPT

## 2024-02-08 RX ORDER — CEPHALEXIN 500 MG
500 CAPSULE ORAL EVERY 12 HOURS
Refills: 0 | Status: DISCONTINUED | OUTPATIENT
Start: 2024-02-08 | End: 2024-02-08

## 2024-02-08 RX ORDER — LOSARTAN POTASSIUM 100 MG/1
1 TABLET, FILM COATED ORAL
Qty: 30 | Refills: 0
Start: 2024-02-08 | End: 2024-03-08

## 2024-02-08 RX ORDER — ACETAMINOPHEN 500 MG
3 TABLET ORAL
Qty: 0 | Refills: 0 | DISCHARGE
Start: 2024-02-08

## 2024-02-08 RX ORDER — LOSARTAN POTASSIUM 100 MG/1
1 TABLET, FILM COATED ORAL
Qty: 0 | Refills: 0 | DISCHARGE
Start: 2024-02-08

## 2024-02-08 RX ADMIN — TAMSULOSIN HYDROCHLORIDE 0.4 MILLIGRAM(S): 0.4 CAPSULE ORAL at 00:03

## 2024-02-08 RX ADMIN — LOSARTAN POTASSIUM 25 MILLIGRAM(S): 100 TABLET, FILM COATED ORAL at 17:10

## 2024-02-08 RX ADMIN — PIPERACILLIN AND TAZOBACTAM 25 GRAM(S): 4; .5 INJECTION, POWDER, LYOPHILIZED, FOR SOLUTION INTRAVENOUS at 00:11

## 2024-02-08 RX ADMIN — Medication 145 MILLIGRAM(S): at 09:53

## 2024-02-08 RX ADMIN — PIPERACILLIN AND TAZOBACTAM 25 GRAM(S): 4; .5 INJECTION, POWDER, LYOPHILIZED, FOR SOLUTION INTRAVENOUS at 07:12

## 2024-02-08 RX ADMIN — Medication 250 MILLIGRAM(S): at 03:52

## 2024-02-08 RX ADMIN — ENOXAPARIN SODIUM 40 MILLIGRAM(S): 100 INJECTION SUBCUTANEOUS at 09:53

## 2024-02-08 RX ADMIN — TAMSULOSIN HYDROCHLORIDE 0.4 MILLIGRAM(S): 0.4 CAPSULE ORAL at 21:15

## 2024-02-08 RX ADMIN — Medication 5 MILLIGRAM(S): at 21:15

## 2024-02-08 RX ADMIN — Medication 1 TABLET(S): at 13:08

## 2024-02-08 NOTE — DIETITIAN INITIAL EVALUATION ADULT - NAME AND PHONE
Pallavi Sharma (dietetic intern)  Pallavi Sharma (dietetic intern), Lana Paul, MS, RDN, CDN  ([id number])

## 2024-02-08 NOTE — PHYSICAL THERAPY INITIAL EVALUATION ADULT - PERTINENT HX OF CURRENT PROBLEM, REHAB EVAL
Patient left writer voice message, wanted to let you know, is having surgery on 02-15-21 with Dr Meeks, at [facility], to remove R lung nodule. Writer spoke to patient to assure message would be passed on, also had asked patient if [facility] was requesting H&P prior. Patient stated, was not told that, but did say, had EKG and blood work done at [facility]. Routing to Dr Hudson as DMITRIY   89 yo M with PMHx of HTN, BPH, and DM who presents for right great toe swelling and redness. He reports that he tripped and fell around 1 week ago and banged his big toe. His big toe then became red and swollen, worsening over the past few days. Per daughter he was recently on Augmentin for a different toe infection. He has been able to ambulate independently but does have some pain when walking. He lives at [facility] living [facility]. Denies other complaints, fever, chills, sob, chest pain, nausea, vomiting, and diarrhea.

## 2024-02-08 NOTE — DISCHARGE NOTE PROVIDER - ATTENDING DISCHARGE PHYSICAL EXAMINATION:
Pt is 89 yo man with HTN, DM, admitted after a recent fall with toe injury. Pt underwent removal of the toenail with good tolerance. No signs of infection process noted. Given exposed soft tissues around the nailbed to prevent paronychia, short course of Bactrim will be prescribed on discharge together with topical antibiotic ointment. Pt is being discharged to his retirement.

## 2024-02-08 NOTE — DIETITIAN INITIAL EVALUATION ADULT - PERTINENT LABORATORY DATA
02-08    134<L>  |  98  |  17  ----------------------------<  148<H>  3.5   |  25  |  0.96    Ca    9.3      08 Feb 2024 05:30  Phos  3.2     02-08  Mg     1.8     02-08    TPro  6.1  /  Alb  3.6  /  TBili  0.6  /  DBili  x   /  AST  23  /  ALT  21  /  AlkPhos  42  02-08  POCT Blood Glucose.: 149 mg/dL (02-08-24 @ 12:03)  A1C with Estimated Average Glucose Result: 6.2 % (02-08-24 @ 05:30)

## 2024-02-08 NOTE — DIETITIAN INITIAL EVALUATION ADULT - NUTRITIONGOAL OUTCOME1
Pt to meet at least 75% of nutritional needs consistently  Pt to recall and verbalize at least 3 nutrition education points

## 2024-02-08 NOTE — DISCHARGE NOTE PROVIDER - NSFTFHOMEHTHYNRD_GEN_ALL_CORE
[facility] DIVISION OF KIDNEY DISEASES AND HYPERTENSION   FOLLOW UP NOTE    --------------------------------------------------------------------------------  Chief Complaint: NORMAN on CKD    24 hour events/subjective: Pt. was seen and examined today, not in distress. Denies fever, chills, SOB, CP, dysuria. Scr was elevated at 3.7 on 6/17/23. SCr decreased to 1.94 today.    PAST HISTORY  --------------------------------------------------------------------------------  No significant changes to PMH, PSH, FHx, SHx, unless otherwise noted    ALLERGIES & MEDICATIONS  --------------------------------------------------------------------------------  Allergies    No Known Allergies    Intolerances      Standing Inpatient Medications  apixaban 5 milliGRAM(s) Oral every 12 hours  aspirin  chewable 81 milliGRAM(s) Oral daily  chlorhexidine 2% Cloths 1 Application(s) Topical <User Schedule>  dextrose 5%. 1000 milliLiter(s) IV Continuous <Continuous>  dextrose 5%. 1000 milliLiter(s) IV Continuous <Continuous>  dextrose 50% Injectable 25 Gram(s) IV Push once  dextrose 50% Injectable 25 Gram(s) IV Push once  dextrose 50% Injectable 12.5 Gram(s) IV Push once  folic acid 1 milliGRAM(s) Oral daily  glucagon  Injectable 1 milliGRAM(s) IntraMuscular once  insulin glargine Injectable (LANTUS) 8 Unit(s) SubCutaneous every morning  insulin lispro (ADMELOG) corrective regimen sliding scale   SubCutaneous three times a day before meals  insulin lispro (ADMELOG) corrective regimen sliding scale   SubCutaneous <User Schedule>  insulin lispro Injectable (ADMELOG) 2 Unit(s) SubCutaneous before lunch  insulin lispro Injectable (ADMELOG) 4 Unit(s) SubCutaneous before breakfast  metoprolol succinate  milliGRAM(s) Oral daily  nystatin    Suspension 908497 Unit(s) Oral three times a day  potassium phosphate / sodium phosphate Powder (PHOS-NaK) 1 Packet(s) Oral once  predniSONE   Tablet 30 milliGRAM(s) Oral daily  senna 2 Tablet(s) Oral at bedtime  tacrolimus 2 milliGRAM(s) Oral <User Schedule>  trimethoprim   80 mG/sulfamethoxazole 400 mG 1 Tablet(s) Oral <User Schedule>  valGANciclovir 450 milliGRAM(s) Oral <User Schedule>    PRN Inpatient Medications  acetaminophen     Tablet .. 650 milliGRAM(s) Oral every 6 hours PRN  aluminum hydroxide/magnesium hydroxide/simethicone Suspension 30 milliLiter(s) Oral every 4 hours PRN  dextrose Oral Gel 15 Gram(s) Oral once PRN      REVIEW OF SYSTEMS  --------------------------------------------------------------------------------  Gen: No fevers/chills  Head/Eyes/Ears: No HA   Respiratory: No dyspnea, cough  CV: No chest pain, OHT  GI: No abdominal pain, diarrhea  : No dysuria, hematuria  MSK: LE edema  Skin: No rashes  Heme: No easy bruising or bleeding      VITALS/PHYSICAL EXAM  --------------------------------------------------------------------------------  T(C): 36.5 (06-20-23 @ 11:39), Max: 36.5 (06-20-23 @ 11:39)  HR: 107 (06-20-23 @ 11:39) (96 - 107)  BP: 108/76 (06-20-23 @ 11:39) (108/76 - 130/90)  RR: 18 (06-20-23 @ 11:39) (18 - 18)  SpO2: 99% (06-20-23 @ 11:39) (97% - 99%)  Wt(kg): --      06-19-23 @ 07:01  -  06-20-23 @ 07:00  --------------------------------------------------------  IN: 360 mL / OUT: 300 mL / NET: 60 mL    06-20-23 @ 07:01  -  06-20-23 @ 11:42  --------------------------------------------------------  IN: 0 mL / OUT: 500 mL / NET: -500 mL      Physical Exam:  	Gen: elderly male, sitting comfortably  	HEENT: Anicteric  	Pulm: CTA B/L  	CV: S1S2+  	Abd: Soft, +BS       	Ext: B/L LE edema++  	Neuro: Awake  	Skin: Warm and dry    LABS/STUDIES  --------------------------------------------------------------------------------              9.0    8.61  >-----------<  508      [06-20-23 @ 10:06]              30.4     138  |  105  |  44  ----------------------------<  140      [06-20-23 @ 10:06]  5.0   |  21  |  1.94        Ca     8.7     [06-20-23 @ 10:06]      Mg     2.4     [06-20-23 @ 10:06]      Phos  2.4     [06-20-23 @ 10:06]    Creatinine Trend:  SCr 1.94 [06-20 @ 10:06]  SCr 2.47 [06-19 @ 10:09]  SCr 3.23 [06-18 @ 09:44]  SCr 3.69 [06-17 @ 10:10]  SCr 3.71 [06-16 @ 10:36]    Urinalysis - [06-14-23 @ 20:23]      Color Light Yellow / Appearance Clear / SG 1.013 / pH 5.5      Gluc Trace / Ketone Negative  / Bili Negative / Urobili Negative       Blood Negative / Protein Negative / Leuk Est Negative / Nitrite Negative      RBC  / WBC  / Hyaline  / Gran  / Sq Epi  / Non Sq Epi  / Bacteria     Urine Creatinine 56      [06-15-23 @ 01:34]  Urine Protein <7      [06-15-23 @ 01:34]  Urine Sodium 97      [06-15-23 @ 01:34]  Urine Urea Nitrogen 216      [06-15-23 @ 01:34]  Urine Potassium 14      [06-15-23 @ 01:34]  Urine Osmolality 308      [06-15-23 @ 01:33]    TacrolimusTacrolimus (), Serum: 3.7 ng/mL (06-19 @ 10:09)  Tacrolimus (), Serum: 4.9 ng/mL (06-18 @ 09:44)  Tacrolimus (), Serum: 5.8 ng/mL (06-17 @ 10:10) Yes

## 2024-02-08 NOTE — PROGRESS NOTE ADULT - ASSESSMENT
89 y/o M with PMH of HTN, BPH, and DM presents for right hallux pain and cellulitis following trauma ~ 1 week ago. At time of consult, VSS, WBC 6.9, ESR 16, CRP <3. Pt with hemorrhagic blister over dorsal IPJ. X-rays negative for fx with signs of air in area of blister opening.     Plan:   - Pt is stable for D/C from a podiatric standpoint   -  Instructed pt to keep dressing Clean, dry, and intact until follow up appointment  - Educated pt on return pre-cautions and instructed pt to come to the ED if he notices any signs of infection  - Pt can WBAT in a surgical shoe  - Wound care: Area dressed with betadine, telfa, and judson   - Rest of care per primary team     Discharge dressing instructions: Cleanse wound with normal sailine daily, pat dry with sterile guaze, apply  betadine soaked gauze to wound base, cover with dry sterile gauze, wrap with Judson.     Patient should follow up with Dr. Corby Jackson within 1 week of discharge.    Office information:          [place] Address- [address] [place] [address]. [address], [place] [address] Phone: [phone]   89 y/o M with PMH of HTN, BPH, and DM presents for right hallux pain and cellulitis following trauma ~ 1 week ago. At time of consult, VSS, WBC 6.9, ESR 16, CRP <3. Pt with hemorrhagic blister over dorsal IPJ. X-rays negative for fx with signs of air in area of blister opening.     Plan:   - Pt is stable for D/C from a podiatric standpoint   - Instructed pt to keep dressing clean, dry, and intact until follow up appointment  - Educated pt on return pre-cautions and instructed pt to come to the ED if he notices any signs of infection  - Pt can WBAT in a surgical shoe  - Wound care: Area dressed with betadine, telfa, and judson   - Rest of care per primary team     Discharge dressing instructions: Cleanse wound with normal sailine daily, pat dry with sterile guaze, apply  betadine soaked gauze to wound base, cover with dry sterile gauze, wrap with Judson.     Patient should follow up with Dr. Corby Jackson within 1 week of discharge.    Office information:          [place] Address- [address] [place] [address]. [address], [place] [address] Phone: [phone]   89 y/o M with PMH of HTN, BPH, and DM presents for right hallux pain and cellulitis following trauma ~ 1 week ago. At time of consult, VSS, WBC 6.9, ESR 16, CRP <3. Pt with hemorrhagic blister over dorsal IPJ. X-rays negative for fx with signs of air in area of blister opening.     Plan:   - Pt is stable for D/C from a podiatric standpoint   - Pt may recieve PO abx on dx (Bactrim and Keflex)   - Instructed pt to keep dressing clean, dry, and intact until follow up appointment  - Educated pt on return pre-cautions and instructed pt to come to the ED if he notices any signs of infection  - Pt can WBAT in a surgical shoe  - Wound care: Area dressed with betadine, telfa, and judson   - Rest of care per primary team     Discharge dressing instructions: Cleanse wound with normal sailine daily, pat dry with sterile guaze, apply  betadine soaked gauze to wound base, cover with dry sterile gauze, wrap with Judson.     Patient should follow up with Dr. Corby Jackson within 1 week of discharge.    Office information:          [place] Address- [address] [facility] [facility] [address] [facility]. [address], [place] [address] Phone: [phone]   89 y/o M with PMH of HTN, BPH, and DM presents for right hallux pain and cellulitis following trauma ~ 1 week ago. At time of consult, VSS, WBC 6.9, ESR 16, CRP <3. Pt with hemorrhagic blister over dorsal IPJ. X-rays negative for fx with signs of air in area of blister opening.     Plan:   - Pt is stable for D/C from a podiatric standpoint   - Recommend 10 day course of PO abx on d/c (Bactrim and Keflex)   - Instructed pt to keep dressing clean, dry, and intact until follow up appointment  - Educated pt on return pre-cautions and instructed pt to come to the ED if he notices any signs of infection  - Pt can WBAT in a surgical shoe  - Wound care: Area dressed with telfa, and abdias   - Rest of care per primary team     Discharge Recommendations:    Please leave dressing clean dry and intact until first follow up appointment  Recommend 10 day course of PO abx (Bactrim and Keflex)    Patient should follow up with Dr. Corby Jackson within 1 week of discharge.    Office information:          [place] Address- [address] [place] [address]. [address], [place] [address] Phone: [phone]

## 2024-02-08 NOTE — DIETITIAN INITIAL EVALUATION ADULT - ADD RECOMMEND
1. Continue with ______ diet, _____suplement    2. Consider oral nutrition supplement or liberalizing diet should intake decline </= 50%   3. Encourage and monitor PO intake, honor preferences as able   4. Monitor labs: electrolytes, cmp   5. Monitor labs, weight trends, GI function, and skin integrity    6. Pain and bowel regimen per team   7. Monitor adherence to diet education   8. RD to remain available and follow-up  1. Continue with consistent carbohydrate diet.  2. Consider oral nutrition supplement or liberalizing diet should intake decline </= 50%   3. Encourage and monitor PO intake, honor preferences as able   4. Monitor labs, weight trends, GI function, and skin integrity    5. Pain and bowel regimen per team   6. Monitor adherence to diet education   7. RD to remain available and follow-up

## 2024-02-08 NOTE — PHYSICAL THERAPY INITIAL EVALUATION ADULT - LEVEL OF INDEPENDENCE, REHAB EVAL
naloxone, morphine      Intake/Output Summary (Last 24 hours) at 3/3/2020 1226  Last data filed at 3/3/2020 1119  Gross per 24 hour   Intake 1200 ml   Output --   Net 1200 ml       Diet:  DIET GENERAL; No Added Salt (3-4 GM)    Exam:  /71   Pulse 93   Temp 97.6 °F (36.4 °C) (Oral)   Resp 20   Ht 5' 7\" (1.702 m)   Wt 167 lb 1.6 oz (75.8 kg)   LMP 01/29/2020   SpO2 93%   BMI 26.17 kg/m²     General appearance: Chronically ill-appearing female, sleepy/groggy. Developmental delay, possible speech impediment noted. No apparent distress, appears stated age and cooperative. HEENT: Pupils equal, round, and reactive to light. Conjunctivae/corneas clear. Neck: Supple, with full range of motion. No jugular venous distention. Trachea midline. Respiratory:  Normal respiratory effort. Clear to auscultation, bilaterally without Rales/Wheezes/Rhonchi. Cardiovascular: Regular rate and rhythm with normal S1/S2 without murmurs, rubs or gallops. Abdomen: Soft, non-tender, non-distended with normal bowel sounds. Musculoskeletal: Passive and active ROM x 4 extremities. Skin: Skin color, texture, turgor normal.  No rashes or lesions. Neurologic:  Neurovascularly intact without any focal sensory/motor deficits. Cranial nerves: II-XII intact, grossly non-focal.  Psychiatric: Alert and oriented to person, place, time, and situation.  Thought content appropriate, normal insight  Capillary Refill: Brisk,< 3 seconds   Peripheral Pulses: +2 palpable, equal bilaterally     Labs:   Recent Labs     03/02/20  1800 03/03/20  0100 03/03/20  0643   WBC 7.9  --  7.1   HGB 7.6* 7.5* 7.1*   HCT 25.9* 25.2* 24.5*     --  281     Recent Labs     03/02/20  1800 03/03/20  0643   * 141   K 3.5 3.2*   CL 93* 104   CO2 25 25   BUN 16 9   CREATININE 0.9 0.8   CALCIUM 9.0 8.8     Recent Labs     03/02/20  1800   AST 51*   ALT 28   BILITOT 1.2   ALKPHOS 479*     Recent Labs     03/02/20  1800   INR 1.32*     No results for recurrent disease. Extensive retroperitoneal and pelvic chain adenopathy, bony, hepatic and pulmonary metastases as described above. **This report has been created using voice recognition software. It may contain minor errors which are inherent in voice recognition technology. **      Final report electronically signed by Dr. Jermaine Cristobal on 3/2/2020 8:07 PM        Ct Abdomen Pelvis W Iv Contrast Additional Contrast? None    Result Date: 3/2/2020  PROCEDURE: CT ABDOMEN PELVIS W IV CONTRAST CLINICAL INFORMATION: hx of FAP, recent CT w/ presacral mass, fluid collection, rectal bleeding . COMPARISON: February 10, 2020 TECHNIQUE: 5 mm axial CT images were obtained through the abdomen and pelvis after the administration of intravenous and oral contrast. Coronal and sagittal reconstructions were obtained. All CT scans at this facility use dose modulation, iterative reconstruction, and/or weight-based dosing when appropriate to reduce radiation dose to as low as reasonably achievable. FINDINGS: There is a small right pleural effusion with multiple pulmonary nodules in the lung bases concerning for metastatic disease. Largest in the right lung base is with irregular margins measuring 1 cm. There is adjacent atelectasis and consolidation. Innumerable hepatic foci concerning for diffuse metastatic disease. Hepatomegaly. Cholecystectomy. Spleen, pancreas adrenal glands are unremarkable. Low-attenuation in the left kidney measuring 1.1 cm. Indeterminate lesion. Hounsfield density is intermediate. Nonobstructing left intrarenal calculus. Normal caliber abdominal aorta. There is thickened, irregular mass in the sacrum in keeping with known rectal malignancy. No obstruction. There is a right lower quadrant ostomy. Catheter terminating in the low mid abdomen.  There is heterogeneous soft tissue mass and bony erosion concerning for diffuse bony metastases involving the left acetabulum, pelvic sidewall measuring 4.5 x 6 cm, pubic independent

## 2024-02-08 NOTE — DIETITIAN INITIAL EVALUATION ADULT - OTHER INFO
87 yo M with PMHx of HTN, BPH, and DM who presents for right great toe swelling and redness. He reports that he tripped and fell around 1 week ago and banged his big toe. His big toe then became red and swollen, worsening over the past few days. Per daughter he was recently on Augmentin for a different toe infection. He has been able to ambulate independently but does have some pain when walking. He lives at [facility] living [facility]. Denies other complaints, fever, chills, sob, chest pain, nausea, vomiting, and diarrhea.   Patient seen at bedside for _____assessment. Currently on ____ diet. No known food allergies. No difficulty chewing/swallowing reported. Reports ____appetite. Patient consumed ____% of breakfast which included ______. PTA, patient’s usual meals consisted of__________. Dosing weight: ____pounds, BMI ____, reports UBW of ____ pounds. Ideal Body weight ______, _____%. No pressure injuries documented. No edema documented. Denies nausea, comiting, diarrhea, constipation. Labs:____ Meds: _____. Observed with no overt signs and symptoms of muscle or fat wasting. Based on ASPEN guidelines, patient does not meet criteria for malnutrition. No cultural, ethnic, Jain food preferences noted. See nutrition recommendations below.     Nutrition education  89 yo M with PMHx of HTN, BPH, and DM who presents for right great toe swelling and redness. He reports that he tripped and fell around 1 week ago and banged his big toe. His big toe then became red and swollen, worsening over the past few days. Per daughter he was recently on Augmentin for a different toe infection. He has been able to ambulate independently but does have some pain when walking. He lives at [facility] living [facility]. Denies other complaints, fever, chills, sob, chest pain, nausea, vomiting, and diarrhea.      Patient seen at bedside for nutrition assessment. Currently on consistent carbohydrate diet (evening snack). No known food allergies or any difficulty chewing/swallowing reported. Reports good appetite. Pt having breakfast at the time of assessment, consisting of pineapple yogurt parfait, fruit cup and oatmeal. PTA, pt reports good appetite, with a preference for decaffeinated coffee. Pt mentions weight fluctuations, with no overall change, PTA. Dosing weight: 160pounds, BMI 23.6, reports Usual Body Weight of 165 pounds. Ideal Body weight 160, 100%. Observed with mild muscle or fat wasting (temples, shoulders, buccal), probably age associated. Encouragement provided for optimal energy intake and adequate protein intake.  No pressure injuries or edema documented. Denies nausea, vomiting, diarrhea, constipation. Last BM 2/8/24. Labs: glucose 106-149 x 24hours, sodium 134 (L) Medication: antibiotics, losartan, Flomax, Toradol. Based on ASPEN guidelines, pt does not meet the criteria for malnutrition. No cultural, ethnic, Mandaen food preferences noted. See nutrition recommendations below.              89 yo M with PMHx of HTN, BPH, and DM who presents for right great toe swelling and redness. He reports that he tripped and fell around 1 week ago and banged his big toe. His big toe then became red and swollen, worsening over the past few days. Per daughter he was recently on Augmentin for a different toe infection. He has been able to ambulate independently but does have some pain when walking. He lives at [facility] living [facility]. Denies other complaints, fever, chills, sob, chest pain, nausea, vomiting, and diarrhea.      Patient seen at bedside for nutrition assessment. Currently on consistent carbohydrate diet (evening snack). No known food allergies or any difficulty chewing/swallowing reported. Reports good appetite. Pt having breakfast at the time of assessment consisting of yogurt parfait, fruit cup and oatmeal. PTA, pt reports good appetite, with a preference for decaffeinated coffee. Dosing weight: 160 pounds, BMI 23.6, reports Usual Body Weight of 165 pounds. Ideal Body weight 160, 100%. Observed with mild muscle and fat wasting (temples, shoulders, buccal), likely in setting of age-related sarcopenia due to good PO intake and no weight loss reported. Reports he does not monitor his blood sugar. Provided nutrition education discussing importance of adequate protein, food sources of protein, consistent carbohydrates, sugar-free sweets. No pressure injuries or edema documented. Denies nausea, vomiting, diarrhea, constipation. Last BM 2/8/24 per patient. Labs: glucose 106-149 x 24hours, sodium 134 (L), HgbA1c 6.2%. Medications: antibiotic. Based on ASPEN guidelines, pt does not meet the criteria for malnutrition. See nutrition recommendations below.

## 2024-02-08 NOTE — DISCHARGE NOTE PROVIDER - NSDCCPCAREPLAN_GEN_ALL_CORE_FT
PRINCIPAL DISCHARGE DIAGNOSIS  Diagnosis: Cellulitis of right toe  Assessment and Plan of Treatment:      PRINCIPAL DISCHARGE DIAGNOSIS  Diagnosis: Cellulitis of right toe  Assessment and Plan of Treatment: You had a possible infection in your right toe called cellulitis which was accompanied by a collection of blood called a hematoma - this may have been causing the pain you were experiencing in your foot. A sample of material from your wound showed evidence of bacteria. A Podiatrist drained the excess blood from your wound and cleaned & dressed the wound. In addition, you were started on IV antibiotics to treat the possible infection in your toe and you will be discharged with oral antibiotics which should be taken for three days. You will also be prescribed a topical antibiotic ointment which should be applied with daily dressing changes. Keep your foot clean and dry. Walk using your walker as tolerated. Return to the Emergency Department if you experience fever, chills, signs of foot swelling, or are concerned that your foot is infected. We have made you a followup appointment with a Podiatrist for Monday, February 12th - the details are listed in this discharge document.     PRINCIPAL DISCHARGE DIAGNOSIS  Diagnosis: Cellulitis of right toe  Assessment and Plan of Treatment: You had a possible infection in your right toe called cellulitis which was accompanied by a collection of blood called a hematoma - this may have been causing the pain you were experiencing in your foot. A sample of material from your wound showed evidence of bacteria. A Podiatrist drained the excess blood from your wound and cleaned & dressed the wound. In addition, you were started on IV antibiotics to treat the possible infection in your toe and you will be discharged with oral antibiotics which should be taken for three days. You will also be prescribed a topical antibiotic ointment which should be applied with daily dressing changes. Keep your foot clean and dry. Walk using your walker as tolerated.   **Return to the Emergency Department if you experience fever, chills, signs of foot swelling, or are concerned that your foot is infected.   ***We have made you a follow-up appointment with a Podiatrist for Monday, February 12th at 4:15pm - the details are listed in this discharge document. Please follow-up at this appointment  **Please continue to take Bactrim 1 tab twice a day for the next 3 days, this medication was sent to your pharamcy.l  **Please leave dressing clean dry and intact until first follow up appointment.   **Please wear a plastic bag around your toe while you shower or get a cast cover to shower to keep the dressing clean and intact.      SECONDARY DISCHARGE DIAGNOSES  Diagnosis: Hypertension  Assessment and Plan of Treatment: You have a known history of high blood pressure prior to your admission. High blood pressure can cause damage to your heart and kidneys and increases your risk of heart attack and stroke. To avoid this, It is important that you continue to take this medication when you are discharged so that you can continue to control your blood pressure. Additionally be sure to follow up with your primary care physician on a regular basis to make sure your blood pressure continues to be well controlled. If you experience symptoms such as but not limited to: sudden onset blurry vision, nausea, vomiting, chest pain, shortness of breath, or palpitations, please go to the nearest emergency room.  **Please start losartan 25mg once a day for your Hypertension  **Please follow-up with your PCP for continued management of your BP

## 2024-02-08 NOTE — PROGRESS NOTE ADULT - PROBLEM SELECTOR PLAN 1
#Right hallux cellulitis  Patient w/ right toe pain, swelling, and erythema after mechanical trip/fall last week. Right hallux with hematoma formation on toe. Seen by podiatry in ED.  - s/p Right hallux nail avulsion with hematoma evacuation with podiatry  - XRay right foot: small amount of air within the soft tissues overlying the distal phalanx    Plan:  - f/u wound culture from right hallux subungual hematoma  - Start IV Vanc 1g Q8 and Zosyn 3.375g Q8hrs for broad-spectrum coverage  - WBAT for right foot in surgical shoe   - f/u podiatry recs  - f/u blood cultures  - RLE doppler  - pain control w/ tylenol 975mg Q8hrs

## 2024-02-08 NOTE — DIETITIAN INITIAL EVALUATION ADULT - OTHER CALCULATIONS
Based on Standards of Care pt within % Ideal body weight (160 pounds, 100% Ideal Body Weight). Thus Actual Body Weight used for all calculations. Needs adjusted for advanced age, and malnutrition. Based on Standards of Care pt within % Ideal body weight (160 pounds, 100% Ideal Body Weight), thus Actual Body Weight used for all calculations. Needs adjusted for advanced age.

## 2024-02-08 NOTE — DISCHARGE NOTE PROVIDER - NSDCMRMEDTOKEN_GEN_ALL_CORE_FT
fenofibrate 145 mg oral tablet: 1 tab(s) orally once a day  metFORMIN 500 mg oral tablet, extended release: 1 tab(s) orally once a day (in the evening)  tamsulosin 0.4 mg oral capsule: 1 cap(s) orally once a day   fenofibrate 145 mg oral tablet: 1 tab(s) orally once a day  losartan 25 mg oral tablet: 1 tab(s) orally every 24 hours  metFORMIN 500 mg oral tablet, extended release: 1 tab(s) orally once a day (in the evening)  sulfamethoxazole-trimethoprim 800 mg-160 mg oral tablet: 1 tab(s) orally every 12 hours 02/08/24 - 02/11/24  tamsulosin 0.4 mg oral capsule: 1 cap(s) orally once a day   acetaminophen 325 mg oral tablet: 3 tab(s) orally every 8 hours As needed Temp greater or equal to 38C (100.4F), Mild Pain (1 - 3)  fenofibrate 145 mg oral tablet: 1 tab(s) orally once a day  losartan 25 mg oral tablet: 1 tab(s) orally every 24 hours  metFORMIN 500 mg oral tablet, extended release: 1 tab(s) orally once a day (in the evening)  sulfamethoxazole-trimethoprim 800 mg-160 mg oral tablet: 1 tab(s) orally every 12 hours 02/08/24 - 02/11/24  tamsulosin 0.4 mg oral capsule: 1 cap(s) orally once a day

## 2024-02-08 NOTE — CONSULT NOTE ADULT - SUBJECTIVE AND OBJECTIVE BOX
Attending: Dr. Jackson    Patient is a 88y old  Male who presents with a chief complaint of right toe pain     HPI: 89 y/o M with PMH of HTN, BPH, and DM presents for right hallux pain. Pt states he tripped and fell about 1 week ago where he banged his big and second toe. He does not recall who the podiatrist was or what he recommended. States he developed redness and swelling about two days ago to his big toe. Denies any purulence or malodor. No recent antibiotic use. Has been ambulating in a regular shoe, but does complain of pain in his toe. Denies n/v/f/c/sob.     Review of systems negative except per HPI and as stated below  General:	 no weakness; no fevers, no chills  Skin/Breast: no rash  Respiratory and Thorax: no SOB, no cough  Cardiovascular:	No chest pain  Gastrointestinal:	 no nausea, vomiting , diarrhea  Genitourinary:	no dysuria, no difficulty urinating, no hematuria  Musculoskeletal:	no weakness, no joint swelling/pain  Neurological:	no focal weakness/numbness  Endocrine:	no polyuria, no polydipsia    PAST MEDICAL & SURGICAL HISTORY:  DM (diabetes mellitus)      BPH (benign prostatic hyperplasia)      Hyperlipidemia      No significant past surgical history        Home Medications:  fenofibrate 145 mg oral tablet: 1 tab(s) orally once a day (08 Dec 2023 14:20)  metFORMIN 500 mg oral tablet, extended release: 1 tab(s) orally once a day (in the evening) (08 Dec 2023 14:20)  solifenacin 5 mg oral tablet: 1 tab(s) orally once a day (23 Dec 2020 08:01)  tamsulosin 0.4 mg oral capsule: 1 cap(s) orally once a day (23 Dec 2020 08:01)    Allergies  No Known Allergies        LABS                        10.1   6.90  )-----------( 242      ( 07 Feb 2024 11:07 )             30.1     02-07    138  |  103  |  20  ----------------------------<  106<H>  3.8   |  26  |  1.01    Ca    9.4      07 Feb 2024 11:07        ESR: 16  CRP: --  02-07 @ 11:07    Vital Signs Last 24 Hrs  T(C): 36.5 (07 Feb 2024 10:53), Max: 36.5 (07 Feb 2024 10:53)  T(F): 97.7 (07 Feb 2024 10:53), Max: 97.7 (07 Feb 2024 10:53)  HR: 58 (07 Feb 2024 10:53) (58 - 58)  BP: 180/95 (07 Feb 2024 10:53) (180/95 - 180/95)  BP(mean): --  RR: 18 (07 Feb 2024 10:53) (18 - 18)  SpO2: 98% (07 Feb 2024 10:53) (98% - 98%)    Parameters below as of 07 Feb 2024 10:53  Patient On (Oxygen Delivery Method): room air        PHYSICAL EXAM  General: NAD, AA0x3    Right Lower Extremity Focused:  Vasc: DP/PT 2/4, TG warm to warm, erythema and edema to right hallux extending distally from right proximal phalanx. Temp gradient warm to warm,   Derm: Hemorrhagic blister over dorsal hallux IPJ,dystrophic hallucal nail, no other open wounds  Neuro: protective sensation in tact  MSK: +TTP over R distal phalanx of hallux    RADIOLOGY  < from: Xray Foot AP + Lateral + Oblique, Right (02.07.24 @ 13:16) >  FINDINGS/  IMPRESSION:  There is no fracture or dislocation. There is no radiographic evidence   for acute for myelitis. However, there is a small amount of air within   the soft tissues overlying the distal phalanx, possibly in keeping with   communication with overlying wound although correlation for sequelae of   infection with gas-forming organism may be of utility. There are   scattered degenerative changes.  < end of copied text >                        
  Patient is a 88y old  Male who presents with a chief complaint of right hallux cellulitis (08 Feb 2024 08:20)      HPI:  87 yo M with PMHx of HTN, BPH, and DM who presents for right great toe swelling and redness. He reports that he tripped and fell around 1 week ago and banged his big toe. His big toe then became red and swollen, worsening over the past few days. Per daughter he was recently on Augmentin for a different toe infection. He has been able to ambulate independently but does have some pain when walking. [facility] at [facility] living [facility]. Denies other complaints, fever, chills, sob, chest pain, nausea, vomiting, and diarrhea.     In the ED:  Initial vital signs: /95, HR 58, RR 18, Temp 97.7 degrees F, Sp02 98% on room air  Labs significant for: hgb 10.1, glucose 106, CRP <3, ESR 16  Imaging:  XRay right foot: There is no fracture or dislocation. There is no radiographic evidence for acute for myelitis. However, there is a small amount of air within the soft tissues overlying the distal phalanx, possibly in keeping with   communication with overlying wound although correlation for sequelae of infection with gas-forming organism may be of utility. There are scattered degenerative changes.  CXR: no acute consolidations  Interventions: Zosyn 3.375 gx1. Vanc 1g x1, due to agitation was given Ativan 1mg x1  Consults: podiatry who recommended admission for IV antibiotics (07 Feb 2024 16:28)    PAST MEDICAL & SURGICAL HISTORY:  DM (diabetes mellitus)      BPH (benign prostatic hyperplasia)      Hyperlipidemia      No significant past surgical history        MEDICATIONS  (STANDING):  dextrose 5%. 1000 milliLiter(s) (50 mL/Hr) IV Continuous <Continuous>  dextrose 5%. 1000 milliLiter(s) (100 mL/Hr) IV Continuous <Continuous>  dextrose 50% Injectable 25 Gram(s) IV Push once  dextrose 50% Injectable 25 Gram(s) IV Push once  dextrose 50% Injectable 12.5 Gram(s) IV Push once  enoxaparin Injectable 40 milliGRAM(s) SubCutaneous every 24 hours  fenofibrate Tablet 145 milliGRAM(s) Oral every 24 hours  glucagon  Injectable 1 milliGRAM(s) IntraMuscular once  insulin lispro (ADMELOG) corrective regimen sliding scale   SubCutaneous three times a day before meals  insulin lispro (ADMELOG) corrective regimen sliding scale   SubCutaneous at bedtime  losartan 25 milliGRAM(s) Oral every 24 hours  melatonin 5 milliGRAM(s) Oral at bedtime  piperacillin/tazobactam IVPB.. 3.375 Gram(s) IV Intermittent every 8 hours  tamsulosin 0.4 milliGRAM(s) Oral at bedtime  vancomycin  IVPB 1000 milliGRAM(s) IV Intermittent every 12 hours    MEDICATIONS  (PRN):  acetaminophen     Tablet .. 975 milliGRAM(s) Oral every 8 hours PRN Temp greater or equal to 38C (100.4F), Mild Pain (1 - 3)  dextrose Oral Gel 15 Gram(s) Oral once PRN Blood Glucose LESS THAN 70 milliGRAM(s)/deciliter  ketorolac   Injectable 15 milliGRAM(s) IV Push every 8 hours PRN Moderate Pain (4 - 6)            FAMILY HISTORY:      CBC Full  -  ( 08 Feb 2024 05:30 )  WBC Count : 7.57 K/uL  RBC Count : 3.39 M/uL  Hemoglobin : 10.9 g/dL  Hematocrit : 32.3 %  Platelet Count - Automated : 273 K/uL  Mean Cell Volume : 95.3 fl  Mean Cell Hemoglobin : 32.2 pg  Mean Cell Hemoglobin Concentration : 33.7 gm/dL  Auto Neutrophil # : 5.07 K/uL  Auto Lymphocyte # : 1.57 K/uL  Auto Monocyte # : 0.73 K/uL  Auto Eosinophil # : 0.16 K/uL  Auto Basophil # : 0.03 K/uL  Auto Neutrophil % : 67.1 %  Auto Lymphocyte % : 20.7 %  Auto Monocyte % : 9.6 %  Auto Eosinophil % : 2.1 %  Auto Basophil % : 0.4 %      02-08    134<L>  |  98  |  17  ----------------------------<  148<H>  3.5   |  25  |  0.96    Ca    9.3      08 Feb 2024 05:30  Phos  3.2     02-08  Mg     1.8     02-08    TPro  6.1  /  Alb  3.6  /  TBili  0.6  /  DBili  x   /  AST  23  /  ALT  21  /  AlkPhos  42  02-08      Urinalysis Basic - ( 08 Feb 2024 05:30 )    Color: x / Appearance: x / SG: x / pH: x  Gluc: 148 mg/dL / Ketone: x  / Bili: x / Urobili: x   Blood: x / Protein: x / Nitrite: x   Leuk Esterase: x / RBC: x / WBC x   Sq Epi: x / Non Sq Epi: x / Bacteria: x        Radiology :     < from: Xray Foot AP + Lateral + Oblique, Right (02.07.24 @ 13:16) >  ACC: [id number] EXAM:  XR FOOT COMP MIN 3 VIEWS RT   ORDERED BY: JAMAICA PEREA     PROCEDURE DATE:  02/07/2024          INTERPRETATION:  INDICATION: Right great toe infection    TECHNIQUE: 3 views of the right foot    COMPARISON: None available    FINDINGS/  IMPRESSION:      There is no fracture or dislocation. There is no radiographic evidence   for acute for myelitis. However, there is a small amount of air within   the soft tissues overlying the distal phalanx, possibly in keeping with   communication with overlying wound although correlation for sequelae of   infection with gas-forming organism may be of utility. There are   scattered degenerative changes.          Review of Systems : per HPI         Vital Signs Last 24 Hrs  T(C): 37.1 (08 Feb 2024 05:30), Max: 37.1 (08 Feb 2024 05:30)  T(F): 98.7 (08 Feb 2024 05:30), Max: 98.7 (08 Feb 2024 05:30)  HR: 70 (08 Feb 2024 05:30) (58 - 70)  BP: 177/83 (08 Feb 2024 05:30) (167/80 - 191/91)  BP(mean): --  RR: 17 (08 Feb 2024 05:30) (17 - 18)  SpO2: 96% (08 Feb 2024 05:30) (95% - 100%)    Parameters below as of 08 Feb 2024 05:30  Patient On (Oxygen Delivery Method): room air            Physical Exam :  88 y o man lying comfortably in semi Thomas's position , awake , alert , no acute complaints     Head : normocephalic , atraumatic    Eyes : PERRLA , EOMI , no nystagmus , sclera anicteric    ENT / FACE : neg nasal discharge , uvula midline , no oropharyngeal erythema / exudate    Neck : supple , negative JVD , negative carotid bruits , no thyromegaly    Chest : CTA bilaterally , neg wheeze / rhonchi / rales / crackles / egophany    Cardiovascular : regular rate and rhythm , neg murmurs / rubs / gallops    Abdomen : soft , non distended , non tender to palpation in all 4 quadrants ,  normal bowel sounds     Extremities :  R I toe: erythema. TTP     Neurologic Exam :     Alert and oriented  x 3        Motor Exam:                Right UE:                     no focal weakness ,  > 3+/5 throughout     Left UE:                       no focal weakness ,  > 3+/5 throughout          Right LE:          no focal weakness ,  > 3+/5 throughout          Left LE:          no focal weakness ,  > 3+/5 throughout           Sensation :         intact to light touch x 4 extremities     DTR :           biceps/brachioradialis : equal                            patella/ankle : equal         Gait :  not tested          PM&R Impression : admitted for c/o  R I toe pain/swelling/redness , found to have right hallux hematoma and cellulitis    - deconditioned         Recommendations / Plan :       1) Physical / Occupational therapy focusing on therapeutic exercises , equipment evaluation , bed mobility/transfer out of bed evaluation , progressive ambulation with assistive devices prn .    2) Current disposition plan recommendation  :    pending functional progress , probable return to home/group home ,  P.T.

## 2024-02-08 NOTE — DISCHARGE NOTE NURSING/CASE MANAGEMENT/SOCIAL WORK - PATIENT PORTAL LINK FT
You can access the FollowMyHealth Patient Portal offered by [facility] by registering at the following website: [email or web address]. By joining [employer]’s FollowMyHealth portal, you will also be able to view your health information using other applications (apps) compatible with our system.

## 2024-02-08 NOTE — PROGRESS NOTE ADULT - PROBLEM SELECTOR PLAN 2
Patient w/ PMHx of HTN but no current BP meds. Agitated in ED w/ BP rising to 190. Patient currently denying pain, urinated around 600cc of urine recently.   - start Losartan 25mg qd and increase as tolerated  - f/u bladder scan and PVR

## 2024-02-08 NOTE — DISCHARGE NOTE PROVIDER - HOSPITAL COURSE
#Discharge: do not delete    SRINI SANTOYO is a 88y Male with a past medical history of _____    Presented with _____, found to have _____    Problem List/Main Diagnoses (system-based):   #     #     #    Patient was discharged to: (home/[facility]/acute rehab/hospice, etc. and w/ home health/home PT/RN/home O2)    New medications:   Changes to old medications:  Medications that were stopped:    Items to follow up as outpatient:    Physical exam at the time of discharge:       LABS & STUDIES:   #Discharge: do not delete    SRINI SANTOYO is a 88y Male with a past medical history of  HTN (not on any medication), BPH, and DM who presents for right great toe swelling and redness, found to have right hallux hematoma and cellulitis, admitted to medicine for further management.    Problem List/Main Diagnoses (system-based):   # Cellulitis of right toe.   Right hallux cellulitis. Patient w/ right toe pain, swelling, and erythema after mechanical trip/fall last week. Right hallux with hematoma formation on toe. Seen by podiatry in ED.  - s/p Right hallux nail avulsion with hematoma evacuation with podiatry  - XRay right foot: small amount of air within the soft tissues overlying the distal phalanx    Plan:  - f/u wound culture from right hallux subungual hematoma  - Start IV Vanc 1g Q8 and Zosyn 3.375g Q8hrs for broad-spectrum coverage  - WBAT for right foot in surgical shoe   - f/u podiatry recs  - f/u blood cultures  - RLE doppler  - pain control w/ tylenol 975mg Q8hrs.     #Hypertension.   ·  Plan: Patient w/ PMHx of HTN but no current BP meds. Agitated in ED w/ BP rising to 190. Patient currently denying pain, urinated around 600cc of urine recently.   - start Losartan 25mg qd and increase as tolerated  - f/u bladder scan and PVR.  #DM (diabetes mellitus).   ·  Plan: Home med: Metformin 500mg qd  - start mISS w/ meals and at bedtime  - f/u A1c  - carb consistent diet.     #BPH (benign prostatic hyperplasia).   ·  Plan: Home med: tamsulosin 0.4mg qhs  - c/w tamsulosin 0.4mg qhs  - strict I and Os.    #Hyperlipidemia.   ·  Plan: Home med: fenofibrate 145mg qd  - c/w fenofibrate 145mg qd.    Patient was discharged to: [facility]    New medications:   Changes to old medications:  Medications that were stopped:    Items to follow up as outpatient:    Physical exam at the time of discharge:   Constitutional: NAD  Head: NC/AT  Eyes: PERRL, EOMI, anicteric sclera  ENT: no nasal discharge  Neck: supple;   Respiratory: CTA B/L  Cardiac: +S1/S2; RRR; no M/R/G  Gastrointestinal: abdomen soft, non-tender, mild distension;  Extremities: WWP, Right hallux with hematoma beneath nail w/ surrounding erythema to MTP joint, no purulence noted, wrapped w/ gauze and kerlix; 2+ pitting edema in b/l LEs to the level of the knee  Vascular: 2+ radial, DP pulses B/L  Neurologic: AAOx2 to self and time    LABS & STUDIES:   #Discharge:    SRINI SANTOYO is a 88y Male with a past medical history of HTN (not on any medication), BPH (on Tamsulosin), and DM (on Metformin) who presents for right great toe swelling and redness, following a fall, found to have right hallux hematoma and cellulitis, admitted to medicine for IV antibiotics following hematoma evacuation by Podiatry.    Problem List/Main Diagnoses (system-based):   # Cellulitis of right toe.   Right hallux cellulitis. Patient w/ right toe pain, swelling, and erythema after mechanical trip/fall last week. Right hallux with hematoma formation on toe. Seen by podiatry in ED.  - s/p Right hallux nail avulsion with hematoma evacuation with podiatry  - XRay right foot: small amount of air within the soft tissues overlying the distal phalanx  - Wound culture shows few gram positive cocci in pairs    Plan:  - f/u wound culture from right hallux subungual hematoma  - IV Vanc 1g Q8 and Zosyn 3.375g Q8hrs for broad-spectrum coverage, pending culture  - Disposition on 3 days of Bactrim & topical antibiotic ointment  - WBAT for right foot in surgical shoe   - F/U appointment made with podiatry  - pain control w/ tylenol 975mg Q8hrs.  - f/u blood cultures     #Hypertension.   ·  Plan: Patient w/ PMHx of HTN but no current BP meds. Agitated in ED w/ BP rising to 190. Patient currently denying pain, urinated around 600cc of urine recently.   - started Losartan 25mg qd  - f/u bladder scan and PVR.  #DM (diabetes mellitus).   ·  Plan: Home med: Metformin 500mg qd  - start mISS w/ meals and at bedtime  - f/u A1c  - carb consistent diet.     #BPH (benign prostatic hyperplasia).   ·  Plan: Home med: tamsulosin 0.4mg qhs  - c/w tamsulosin 0.4mg qhs  - strict I and Os.    #Hyperlipidemia.   ·  Plan: Home med: fenofibrate 145mg qd  - c/w fenofibrate 145mg qd.    Patient was discharged to: [facility]    New medications: Bactrim (3 day course) & topical antibiotic ointment to be applied with daily wound dressing changes  Changes to old medications: None  Medications that were stopped: None  Items to follow up as outpatient: Follow-up with Podiatrist on 2/12      Physical exam at the time of discharge:   Constitutional: NAD  Head: NC/AT  Eyes: PERRL, EOMI, anicteric sclera  ENT: no nasal discharge  Neck: supple;   Respiratory: CTA B/L  Cardiac: +S1/S2; RRR; no M/R/G  Gastrointestinal: abdomen soft, non-tender, mild distension;  Extremities: WWP, Right hallux with hematoma beneath nail w/ surrounding erythema to MTP joint, no purulence noted, wrapped w/ gauze and kerlix; 2+ pitting edema in b/l LEs to the level of the knee  Vascular: 2+ radial, DP pulses B/L  Neurologic: AAOx2 to self and time, unable to answer     LABS & STUDIES:  WBC Count: 7.57 K/uL  RBC Count: 3.39 M/uL  Hemoglobin: 10.9 g/dL  Hematocrit: 32.3 %  Mean Cell Volume: 95.3 fl  Mean Cell Hemoglobin: 32.2 pg  Mean Cell Hemoglobin Conc: 33.7 gm/dL  Red Cell Distrib Width: 12.3 %  Platelet Count - Automated: 273 K/uL  Auto Neutrophil #: 5.07 K/uL  Auto Lymphocyte #: 1.57 K/uL  Auto Monocyte #: 0.73 K/uL  Auto Eosinophil #: 0.16 K/uL  Auto Basophil #: 0.03 K/uL  Auto Neutrophil %: 67.1: Comprehensive Metabolic Panel (02.08.24 @ 05:30)   Sodium: 134 mmol/L  Potassium: 3.5 mmol/L  Chloride: 98 mmol/L  Carbon Dioxide: 25 mmol/L  Anion Gap: 11 mmol/L  Blood Urea Nitrogen: 17 mg/dL  Creatinine: 0.96 mg/dL  Glucose: 148 mg/dL  Calcium: 9.3 mg/dL  Protein Total: 6.1 g/dL  Albumin: 3.6 g/dL  Bilirubin Total: 0.6 mg/dL  Alkaline Phosphatase: 42 U/L  Aspartate Aminotransferase (AST/SGOT): 23 U/L  Alanine Aminotransferase (ALT/SGPT): 21 U/L  eGFR: 76:A1C with Estimated Average Glucose (02.08.24 @ 05:30)   A1C with Estimated Average Glucose Result: 6.2:  Gram Stain (Wound):  Few-moderate Gram positive cocci in pairs and clusters. No WBC's seen.   #Discharge:    SRINI SANTOYO is a 88y Male with a past medical history of HTN (not on any medication), BPH (on Tamsulosin), and DM (on Metformin) who presents for right great toe swelling and redness, following a fall, found to have right hallux hematoma and cellulitis, admitted to medicine for IV antibiotics following hematoma evacuation by Podiatry.    Problem List/Main Diagnoses (system-based):   # Cellulitis of right toe.   Right hallux cellulitis. Patient w/ right toe pain, swelling, and erythema after mechanical trip/fall last week. Right hallux with hematoma formation on toe. Seen by podiatry in ED, s/p Right hallux nail avulsion with hematoma evacuation with podiatry. XRay right foot: small amount of air within the soft tissues overlying the distal phalanx. Wound culture shows few gram positive cocci in pairs. Started with Vanc 1g Q8 and Zosyn 3.375g Q8hrs for broad-spectrum coverage, pending culture; after culture data, decided to deescalate to bactrim.   -continue trimethoprim 160mg/sulfamethoxazole 800mg every 12 hours from 02/08/24 - 02/11/24  -pain control with tylenol 975mg q8 as needed  -weight-bearing as tolerated       - Disposition on 3 days of Bactrim & topical antibiotic ointment  - WBAT for right foot in surgical shoe   - F/U appointment made with podiatry  - pain control w/ tylenol 975mg Q8hrs.  - f/u blood cultures  Physical therapy recommends [facility].    #Hypertension.   Patient w/ PMHx of HTN but no current BP meds. Agitated in ED w/ BP rising to 190. Patient currently denying pain, urinated around 600cc of urine recently. Started Losartan 25mg qd inpatient. BPs elevated during stay into SBP 180s in the absence of pain and distress.  -continue with losartan 25mg qd  -continue to monitor BPs    #DM (diabetes mellitus).   Home med: Metformin 500mg qd. Started mISS w/ meals and at bedtime, as well as consistent card diet.  -continue metformin 500mg qd     #BPH (benign prostatic hyperplasia).   Home med: tamsulosin 0.4mg qhs  - c/w tamsulosin 0.4mg qhs    #Hyperlipidemia.   Home med: fenofibrate 145mg qd  - c/w fenofibrate 145mg qd.    Patient was discharged to: [facility]    New medications: Bactrim (02/08/24 - 02/11/24) & topical antibiotic ointment to be applied with daily wound dressing changes  Changes to old medications: None  Medications that were stopped: None  Items to follow up as outpatient: Follow-up with Podiatrist on 2/12      Physical exam at the time of discharge:   Constitutional: NAD  Head: NC/AT  Eyes: PERRL, EOMI, anicteric sclera  ENT: no nasal discharge  Neck: supple;   Respiratory: CTA B/L  Cardiac: +S1/S2; RRR; no M/R/G  Gastrointestinal: abdomen soft, non-tender, mild distension;  Extremities: WWP, Right hallux with hematoma beneath nail w/ surrounding erythema to MTP joint, no purulence noted, wrapped w/ gauze and kerlix; 2+ pitting edema in b/l LEs to the level of the knee  Vascular: 2+ radial, DP pulses B/L  Neurologic: AAOx2 to self and time    LABS & STUDIES:  WBC Count: 7.57 K/uL  RBC Count: 3.39 M/uL  Hemoglobin: 10.9 g/dL  Hematocrit: 32.3 %  Mean Cell Volume: 95.3 fl  Mean Cell Hemoglobin: 32.2 pg  Mean Cell Hemoglobin Conc: 33.7 gm/dL  Red Cell Distrib Width: 12.3 %  Platelet Count - Automated: 273 K/uL  Auto Neutrophil #: 5.07 K/uL  Auto Lymphocyte #: 1.57 K/uL  Auto Monocyte #: 0.73 K/uL  Auto Eosinophil #: 0.16 K/uL  Auto Basophil #: 0.03 K/uL  Auto Neutrophil %: 67.1: Comprehensive Metabolic Panel (02.08.24 @ 05:30)   Sodium: 134 mmol/L  Potassium: 3.5 mmol/L  Chloride: 98 mmol/L  Carbon Dioxide: 25 mmol/L  Anion Gap: 11 mmol/L  Blood Urea Nitrogen: 17 mg/dL  Creatinine: 0.96 mg/dL  Glucose: 148 mg/dL  Calcium: 9.3 mg/dL  Protein Total: 6.1 g/dL  Albumin: 3.6 g/dL  Bilirubin Total: 0.6 mg/dL  Alkaline Phosphatase: 42 U/L  Aspartate Aminotransferase (AST/SGOT): 23 U/L  Alanine Aminotransferase (ALT/SGPT): 21 U/L  eGFR: 76:A1C with Estimated Average Glucose (02.08.24 @ 05:30)   A1C with Estimated Average Glucose Result: 6.2:  Gram Stain (Wound):  Few-moderate Gram positive cocci in pairs and clusters. No WBC's seen.   #Discharge:    SRINI SANTOYO is a 88y Male with a past medical history of HTN (not on any medication), BPH (on Tamsulosin), and DM (on Metformin) who presents for right great toe swelling and redness, following a fall, found to have right hallux hematoma and cellulitis, admitted to medicine for IV antibiotics following hematoma evacuation by podiatry.    Problem List/Main Diagnoses (system-based):   # Cellulitis of right toe.   Right hallux cellulitis. Patient w/ right toe pain, swelling, and erythema after mechanical trip/fall last week. Right hallux with hematoma formation on toe. Seen by podiatry in ED, s/p Right hallux nail avulsion with hematoma evacuation with podiatry. XRay right foot: small amount of air within the soft tissues overlying the distal phalanx. Wound culture shows few gram positive cocci in pairs. Started with Vanc 1g Q8 and Zosyn 3.375g Q8hrs for broad-spectrum coverage, pending culture; after culture data, decided to deescalate to bactrim.   -continue trimethoprim 160mg/sulfamethoxazole 800mg every 12 hours from 02/08/24 - 02/11/24  -pain control with tylenol 975mg q8 as needed  -weight-bearing as tolerated per podiatry  -[facility]      - Disposition on 3 days of Bactrim & topical antibiotic ointment  - WBAT for right foot in surgical shoe   - F/U appointment made with podiatry  - pain control w/ tylenol 975mg Q8hrs.  - f/u blood cultures  Physical therapy recommends [facility].    #Hypertension.   Patient w/ PMHx of HTN but no current BP meds. Agitated in ED w/ BP rising to 190. Patient currently denying pain, urinated around 600cc of urine recently. Started Losartan 25mg qd inpatient. BPs elevated during stay into SBP 180s in the absence of pain and distress.  -continue with losartan 25mg qd  -continue to monitor BPs    #DM (diabetes mellitus).   Home med: Metformin 500mg qd. Started mISS w/ meals and at bedtime, as well as consistent card diet.  -continue metformin 500mg qd     #BPH (benign prostatic hyperplasia).   Home med: tamsulosin 0.4mg qhs  - c/w tamsulosin 0.4mg qhs    #Hyperlipidemia.   Home med: fenofibrate 145mg qd  - c/w fenofibrate 145mg qd.    Patient was discharged to: [facility]    New medications: Bactrim (02/08/24 - 02/11/24) & topical antibiotic ointment to be applied with daily wound dressing changes  Changes to old medications: None  Medications that were stopped: None  Items to follow up as outpatient: Follow-up with Podiatrist on 2/12      Physical exam at the time of discharge:   Constitutional: NAD  Head: NC/AT  Eyes: PERRL, EOMI, anicteric sclera  ENT: no nasal discharge  Neck: supple;   Respiratory: CTA B/L  Cardiac: +S1/S2; RRR; no M/R/G  Gastrointestinal: abdomen soft, non-tender, mild distension;  Extremities: WWP, Right hallux with hematoma beneath nail w/ surrounding erythema to MTP joint, no purulence noted, wrapped w/ gauze and kerlix; 2+ pitting edema in b/l LEs to the level of the knee  Vascular: 2+ radial, DP pulses B/L  Neurologic: AAOx2 to self and time    LABS & STUDIES:  WBC Count: 7.57 K/uL  RBC Count: 3.39 M/uL  Hemoglobin: 10.9 g/dL  Hematocrit: 32.3 %  Mean Cell Volume: 95.3 fl  Mean Cell Hemoglobin: 32.2 pg  Mean Cell Hemoglobin Conc: 33.7 gm/dL  Red Cell Distrib Width: 12.3 %  Platelet Count - Automated: 273 K/uL  Auto Neutrophil #: 5.07 K/uL  Auto Lymphocyte #: 1.57 K/uL  Auto Monocyte #: 0.73 K/uL  Auto Eosinophil #: 0.16 K/uL  Auto Basophil #: 0.03 K/uL  Auto Neutrophil %: 67.1: Comprehensive Metabolic Panel (02.08.24 @ 05:30)   Sodium: 134 mmol/L  Potassium: 3.5 mmol/L  Chloride: 98 mmol/L  Carbon Dioxide: 25 mmol/L  Anion Gap: 11 mmol/L  Blood Urea Nitrogen: 17 mg/dL  Creatinine: 0.96 mg/dL  Glucose: 148 mg/dL  Calcium: 9.3 mg/dL  Protein Total: 6.1 g/dL  Albumin: 3.6 g/dL  Bilirubin Total: 0.6 mg/dL  Alkaline Phosphatase: 42 U/L  Aspartate Aminotransferase (AST/SGOT): 23 U/L  Alanine Aminotransferase (ALT/SGPT): 21 U/L  eGFR: 76:A1C with Estimated Average Glucose (02.08.24 @ 05:30)   A1C with Estimated Average Glucose Result: 6.2:  Gram Stain (Wound):  Few-moderate Gram positive cocci in pairs and clusters. No WBC's seen.   #Discharge:    SRINI SANTOYO is a 88y Male with a past medical history of HTN (not on any medication), BPH (on Tamsulosin), and DM (on Metformin) who presents for right great toe swelling and redness, following a fall, found to have right hallux hematoma and cellulitis, admitted to medicine for IV antibiotics following hematoma evacuation by podiatry.    Problem List/Main Diagnoses (system-based):   # Cellulitis of right toe.   Right hallux cellulitis. Patient w/ right toe pain, swelling, and erythema after mechanical trip/fall last week. Right hallux with hematoma formation on toe. Seen by podiatry in ED, s/p Right hallux nail avulsion with hematoma evacuation with podiatry. XRay right foot: small amount of air within the soft tissues overlying the distal phalanx. Wound culture shows few gram positive cocci in pairs. Started with Vanc 1g Q8 and Zosyn 3.375g Q8hrs for broad-spectrum coverage, pending culture; after culture data, decided to deescalate to bactrim.   - continue trimethoprim 160mg/sulfamethoxazole 800mg every 12 hours from 02/08/24 - 02/11/24  - pain control with tylenol 975mg q8 as needed  - weight-bearing as tolerated per podiatry  - f/u with podiatry on 2/12/24 at 4:15pm   - patient to leave dressing clean dry and intact until first follow up appointment    #Hypertension.   Patient w/ PMHx of HTN but no current BP meds. Agitated in ED w/ BP rising to 190. Patient currently denying pain, urinated around 600cc of urine recently. Started Losartan 25mg qd inpatient. BPs elevated during stay into SBP 180s in the absence of pain and distress.  -continue with losartan 25mg qd  -continue to monitor BPs    #DM (diabetes mellitus).   Home med: Metformin 500mg qd. Started mISS w/ meals and at bedtime, as well as consistent card diet.  -continue metformin 500mg qd     #BPH (benign prostatic hyperplasia).   Home med: tamsulosin 0.4mg qhs  - c/w tamsulosin 0.4mg qhs    #Hyperlipidemia.   Home med: fenofibrate 145mg qd  - c/w fenofibrate 145mg qd.    Patient was discharged to: assisted living facility     New medications: Bactrim (02/08/24 - 02/11/24)  Changes to old medications: None  Medications that were stopped: None  Items to follow up as outpatient: Follow-up with Podiatrist on 2/12      Physical exam at the time of discharge:   Constitutional: NAD  Head: NC/AT  Eyes: PERRL, EOMI, anicteric sclera  ENT: no nasal discharge  Neck: supple;   Respiratory: CTA B/L  Cardiac: +S1/S2; RRR; no M/R/G  Gastrointestinal: abdomen soft, non-tender, mild distension;  Extremities: WWP, Right hallux with hematoma beneath nail w/ surrounding erythema to MTP joint, no purulence noted, wrapped w/ gauze and kerlix; 2+ pitting edema in b/l LEs to the level of the knee  Vascular: 2+ radial, DP pulses B/L  Neurologic: AAOx2 to self and time    LABS & STUDIES:  WBC Count: 7.57 K/uL  RBC Count: 3.39 M/uL  Hemoglobin: 10.9 g/dL  Hematocrit: 32.3 %  Mean Cell Volume: 95.3 fl  Mean Cell Hemoglobin: 32.2 pg  Mean Cell Hemoglobin Conc: 33.7 gm/dL  Red Cell Distrib Width: 12.3 %  Platelet Count - Automated: 273 K/uL  Auto Neutrophil #: 5.07 K/uL  Auto Lymphocyte #: 1.57 K/uL  Auto Monocyte #: 0.73 K/uL  Auto Eosinophil #: 0.16 K/uL  Auto Basophil #: 0.03 K/uL  Auto Neutrophil %: 67.1: Comprehensive Metabolic Panel (02.08.24 @ 05:30)   Sodium: 134 mmol/L  Potassium: 3.5 mmol/L  Chloride: 98 mmol/L  Carbon Dioxide: 25 mmol/L  Anion Gap: 11 mmol/L  Blood Urea Nitrogen: 17 mg/dL  Creatinine: 0.96 mg/dL  Glucose: 148 mg/dL  Calcium: 9.3 mg/dL  Protein Total: 6.1 g/dL  Albumin: 3.6 g/dL  Bilirubin Total: 0.6 mg/dL  Alkaline Phosphatase: 42 U/L  Aspartate Aminotransferase (AST/SGOT): 23 U/L  Alanine Aminotransferase (ALT/SGPT): 21 U/L  eGFR: 76:A1C with Estimated Average Glucose (02.08.24 @ 05:30)   A1C with Estimated Average Glucose Result: 6.2:  Gram Stain (Wound):  Few-moderate Gram positive cocci in pairs and clusters. No WBC's seen.   #Discharge:    SRINI SANTOYO is a 88y Male with a past medical history of HTN (not on any medication), BPH (on Tamsulosin), and DM (on Metformin) who presents for right great toe swelling and redness, following a fall, found to have right hallux hematoma and cellulitis, admitted to medicine for IV antibiotics following hematoma evacuation by podiatry.    Problem List/Main Diagnoses (system-based):   # Cellulitis of right toe.   Right hallux cellulitis. Patient w/ right toe pain, swelling, and erythema after mechanical trip/fall last week. Right hallux with hematoma formation on toe. Seen by podiatry in ED, s/p Right hallux nail avulsion with hematoma evacuation with podiatry. XRay right foot: small amount of air within the soft tissues overlying the distal phalanx. Wound culture shows few gram positive cocci in pairs. Started with Vanc 1g Q8 and Zosyn 3.375g Q8hrs for broad-spectrum coverage, pending culture; after culture data, decided to deescalate to bactrim.   - continue trimethoprim 160mg/sulfamethoxazole 800mg every 12 hours from 02/08/24 - 02/11/24  - pain control with tylenol 975mg q8 as needed  - weight-bearing as tolerated per podiatry  - f/u with podiatry on 2/12/24 at 4:15pm   - patient to leave dressing clean dry and intact until first follow up appointment    #Hypertension.   Patient w/ PMHx of HTN but no current BP meds. Agitated in ED w/ BP rising to 190. Patient currently denying pain, urinated around 600cc of urine recently. Started Losartan 25mg qd inpatient. BPs elevated during stay into SBP 180s in the absence of pain and distress.  -continue with losartan 25mg qd  -continue to monitor BPs    #DM (diabetes mellitus).   Home med: Metformin 500mg qd. Started mISS w/ meals and at bedtime, as well as consistent card diet.  -continue metformin 500mg qd     #BPH (benign prostatic hyperplasia).   Home med: tamsulosin 0.4mg qhs  - c/w tamsulosin 0.4mg qhs    #Hyperlipidemia.   Home med: fenofibrate 145mg qd  - c/w fenofibrate 145mg qd.    Patient was discharged to: assisted living facility     New medications: Bactrim (02/08/24 - 02/11/24) and losartan 25mg qd  Changes to old medications: None  Medications that were stopped: None  Items to follow up as outpatient: Follow-up with Podiatrist on 2/12      Physical exam at the time of discharge:   Constitutional: NAD  Head: NC/AT  Eyes: PERRL, EOMI, anicteric sclera  ENT: no nasal discharge  Neck: supple;   Respiratory: CTA B/L  Cardiac: +S1/S2; RRR; no M/R/G  Gastrointestinal: abdomen soft, non-tender, mild distension;  Extremities: WWP, Right hallux with hematoma beneath nail w/ surrounding erythema to MTP joint, no purulence noted, wrapped w/ gauze and kerlix; 2+ pitting edema in b/l LEs to the level of the knee  Vascular: 2+ radial, DP pulses B/L  Neurologic: AAOx2 to self and time

## 2024-02-08 NOTE — DIETITIAN INITIAL EVALUATION ADULT - PERTINENT MEDS FT
MEDICATIONS  (STANDING):  dextrose 5%. 1000 milliLiter(s) (50 mL/Hr) IV Continuous <Continuous>  dextrose 5%. 1000 milliLiter(s) (100 mL/Hr) IV Continuous <Continuous>  dextrose 50% Injectable 25 Gram(s) IV Push once  dextrose 50% Injectable 12.5 Gram(s) IV Push once  dextrose 50% Injectable 25 Gram(s) IV Push once  enoxaparin Injectable 40 milliGRAM(s) SubCutaneous every 24 hours  fenofibrate Tablet 145 milliGRAM(s) Oral every 24 hours  glucagon  Injectable 1 milliGRAM(s) IntraMuscular once  losartan 25 milliGRAM(s) Oral every 24 hours  melatonin 5 milliGRAM(s) Oral at bedtime  tamsulosin 0.4 milliGRAM(s) Oral at bedtime  trimethoprim  160 mG/sulfamethoxazole 800 mG 1 Tablet(s) Oral every 12 hours    MEDICATIONS  (PRN):  acetaminophen     Tablet .. 975 milliGRAM(s) Oral every 8 hours PRN Temp greater or equal to 38C (100.4F), Mild Pain (1 - 3)  dextrose Oral Gel 15 Gram(s) Oral once PRN Blood Glucose LESS THAN 70 milliGRAM(s)/deciliter  ketorolac   Injectable 15 milliGRAM(s) IV Push every 8 hours PRN Moderate Pain (4 - 6)

## 2024-02-08 NOTE — PROGRESS NOTE ADULT - SUBJECTIVE AND OBJECTIVE BOX
Patient is a 88y old  Male who presents with a chief complaint of right hallux cellulitis (08 Feb 2024 08:32)      INTERVAL HPI/ OVERNIGHT EVENTS Pt was seen bedside during AM rounds. Pt was resting comfortably in bed. Dressings were found to be clean, dry, and intact. Pt has no other pedal complaints at this time.       LABS                        10.9   7.57  )-----------( 273      ( 08 Feb 2024 05:30 )             32.3     02-08    134<L>  |  98  |  17  ----------------------------<  148<H>  3.5   |  25  |  0.96    Ca    9.3      08 Feb 2024 05:30  Phos  3.2     02-08  Mg     1.8     02-08    TPro  6.1  /  Alb  3.6  /  TBili  0.6  /  DBili  x   /  AST  23  /  ALT  21  /  AlkPhos  42  02-08      ESR: 16  CRP: --  02-07 @ 11:07    ICU Vital Signs Last 24 Hrs  T(C): 37.1 (08 Feb 2024 05:30), Max: 37.1 (08 Feb 2024 05:30)  T(F): 98.7 (08 Feb 2024 05:30), Max: 98.7 (08 Feb 2024 05:30)  HR: 70 (08 Feb 2024 05:30) (58 - 70)  BP: 177/83 (08 Feb 2024 05:30) (167/80 - 191/91)  BP(mean): --  ABP: --  ABP(mean): --  RR: 17 (08 Feb 2024 05:30) (17 - 18)  SpO2: 96% (08 Feb 2024 05:30) (95% - 100%)    O2 Parameters below as of 08 Feb 2024 05:30  Patient On (Oxygen Delivery Method): room air      RADIOLOGY  < from: US Duplex Venous Lower Ext Ltd, Right (02.07.24 @ 21:19) >      INTERPRETATION:  CLINICAL INFORMATION: Lower extremity swelling. Evaluate   for DVT.    COMPARISON: None available.    TECHNIQUE: Duplex sonography of the RIGHT LOWER extremity veins with   color and spectral Doppler, with and without compression.    FINDINGS:    There is normal compressibility of the right common femoral, femoral and   popliteal veins.  The contralateral common femoral vein is patent.  Doppler examination shows normal spontaneous and phasic flow.    No calf vein thrombosis is detected.    IMPRESSION:  No evidence of right lower extremity deep venous thrombosis.      --- End of Report ---    < end of copied text >    < from: Xray Foot AP + Lateral + Oblique, Right (02.07.24 @ 13:16) >    INTERPRETATION:  INDICATION: Right great toe infection    TECHNIQUE: 3 views of the right foot    COMPARISON: None available    FINDINGS/  IMPRESSION:      There is no fracture or dislocation. There is no radiographic evidence   for acute for myelitis. However, there is a small amount of air within   the soft tissues overlying the distal phalanx, possibly in keeping with   communication with overlying wound although correlation for sequelae of   infection with gas-forming organism may be of utility. There are   scattered degenerative changes.    --- End of Report ---    < end of copied text >    MICROBIOLOGY  Culture - Other (02.07.24 @ 14:20)    Gram Stain:   Few-moderate Gram positive cocci in pairs and clusters  No WBC's seen.   Specimen Source: Wound Wound      PHYSICAL EXAM    Right Lower Extremity Focused:  Vasc: DP/PT 2/4, TG warm to warm, erythema and edema to right hallux has improved since last encounter, Temp gradient warm to cold,   Derm: Hemorrhagic blister over dorsal hallux IPJ,dystrophic hallucal nail, no other open wounds  Neuro: protective sensation in tact  MSK: +TTP over R distal phalanx of hallux  
**INCOMPLETE NOTE    OVERNIGHT EVENTS:    SUBJECTIVE:  Patient seen and examined at bedside.    Vital Signs Last 12 Hrs  T(F): 98.7 (02-08-24 @ 05:30), Max: 98.7 (02-08-24 @ 05:30)  HR: 70 (02-08-24 @ 05:30) (63 - 70)  BP: 177/83 (02-08-24 @ 05:30) (177/83 - 184/78)  BP(mean): --  RR: 17 (02-08-24 @ 05:30) (17 - 17)  SpO2: 96% (02-08-24 @ 05:30) (95% - 96%)  I&O's Summary      PHYSICAL EXAM:  Constitutional: NAD, comfortable in bed.  HEENT: NC/AT, PERRLA, EOMI, no conjunctival pallor or scleral icterus, MMM  Neck: Supple, no JVD  Respiratory: CTA B/L. No w/r/r.   Cardiovascular: RRR, normal S1 and S2, no m/r/g.   Gastrointestinal: +BS, soft NTND, no guarding or rebound tenderness, no palpable masses   Extremities: wwp; no cyanosis, clubbing or edema.   Vascular: Pulses equal and strong throughout.   Neurological: AAOx3, no CN deficits, strength and sensation intact throughout.   Skin: No gross skin abnormalities or rashes        LABS:                        10.9   7.57  )-----------( 273      ( 08 Feb 2024 05:30 )             32.3     02-08    134<L>  |  98  |  17  ----------------------------<  148<H>  3.5   |  25  |  0.96    Ca    9.3      08 Feb 2024 05:30  Phos  3.2     02-08  Mg     1.8     02-08    TPro  6.1  /  Alb  3.6  /  TBili  0.6  /  DBili  x   /  AST  23  /  ALT  21  /  AlkPhos  42  02-08      Urinalysis Basic - ( 08 Feb 2024 05:30 )    Color: x / Appearance: x / SG: x / pH: x  Gluc: 148 mg/dL / Ketone: x  / Bili: x / Urobili: x   Blood: x / Protein: x / Nitrite: x   Leuk Esterase: x / RBC: x / WBC x   Sq Epi: x / Non Sq Epi: x / Bacteria: x          RADIOLOGY & ADDITIONAL TESTS:    MEDICATIONS  (STANDING):  dextrose 5%. 1000 milliLiter(s) (50 mL/Hr) IV Continuous <Continuous>  dextrose 5%. 1000 milliLiter(s) (100 mL/Hr) IV Continuous <Continuous>  dextrose 50% Injectable 12.5 Gram(s) IV Push once  dextrose 50% Injectable 25 Gram(s) IV Push once  dextrose 50% Injectable 25 Gram(s) IV Push once  enoxaparin Injectable 40 milliGRAM(s) SubCutaneous every 24 hours  fenofibrate Tablet 145 milliGRAM(s) Oral every 24 hours  glucagon  Injectable 1 milliGRAM(s) IntraMuscular once  insulin lispro (ADMELOG) corrective regimen sliding scale   SubCutaneous three times a day before meals  insulin lispro (ADMELOG) corrective regimen sliding scale   SubCutaneous at bedtime  losartan 25 milliGRAM(s) Oral every 24 hours  melatonin 5 milliGRAM(s) Oral at bedtime  piperacillin/tazobactam IVPB.. 3.375 Gram(s) IV Intermittent every 8 hours  tamsulosin 0.4 milliGRAM(s) Oral at bedtime  vancomycin  IVPB 1000 milliGRAM(s) IV Intermittent every 12 hours    MEDICATIONS  (PRN):  acetaminophen     Tablet .. 975 milliGRAM(s) Oral every 8 hours PRN Temp greater or equal to 38C (100.4F), Mild Pain (1 - 3)  dextrose Oral Gel 15 Gram(s) Oral once PRN Blood Glucose LESS THAN 70 milliGRAM(s)/deciliter  ketorolac   Injectable 15 milliGRAM(s) IV Push every 8 hours PRN Moderate Pain (4 - 6)

## 2024-02-08 NOTE — CONSULT NOTE ADULT - ASSESSMENT
I M    88 y o M with PMHx of HTN, BPH, and DM who presents for right great toe swelling and redness, found to have right hallux hematoma and cellulitis, admitted to medicine for further management.    Problem/Plan - 1:  ·  Problem: Cellulitis of right toe.   ·  Plan: #Right hallux cellulitis  Patient w/ right toe pain, swelling, and erythema after mechanical trip/fall last week. Right hallux with hematoma formation on toe. Seen by podiatry in ED.  - s/p Right hallux nail avulsion with hematoma evacuation with podiatry  - XRay right foot: small amount of air within the soft tissues overlying the distal phalanx    Plan:  - f/u wound culture from right hallux subungual hematoma  - Start IV Vanc 1g Q8 and Zosyn 3.375g Q8hrs for broad-spectrum coverage  - WBAT for right foot in surgical shoe   - f/u podiatry recs  - f/u blood cultures  - RLE doppler  - pain control w/ tylenol 975mg Q8hrs.    Problem/Plan - 2:  ·  Problem: Hypertension.   ·  Plan: Patient w/ PMHx of HTN but no current BP meds. Agitated in ED w/ BP rising to 190. Patient currently denying pain, urinated around 600cc of urine recently.   - start Losartan 25mg qd and increase as tolerated  - f/u bladder scan and PVR.    Problem/Plan - 3:  ·  Problem: DM (diabetes mellitus).   ·  Plan: Home med: Metformin 500mg qd  - start mISS w/ meals and at bedtime  - f/u A1c  - carb consistent diet.    Problem/Plan - 4:  ·  Problem: BPH (benign prostatic hyperplasia).   ·  Plan: Home med: tamsulosin 0.4mg qhs  - c/w tamsulosin 0.4mg qhs  - strict I and Os.    Problem/Plan - 5:  ·  Problem: Hyperlipidemia.   ·  Plan: Home med: fenofibrate 145mg qd  - c/w fenofibrate 145mg qd.    Problem/Plan - 6:  ·  Problem: Preventive measure.   ·  Plan: F: None  E: replete as necessary  N: carb consistent diet  DVT: lovenox  Dispo: RMF.

## 2024-02-08 NOTE — DISCHARGE NOTE PROVIDER - CARE PROVIDER_API CALL
[facility]  Podiatric Medicine  [address] [place] [address], [address]  [place] [address]  Phone: [phone]  Fax: [phone]  Scheduled Appointment: 02/12/2024 04:15 PM   RSV infection

## 2024-02-08 NOTE — PHYSICAL THERAPY INITIAL EVALUATION ADULT - GENERAL OBSERVATIONS, REHAB EVAL
PT IE Completed. Pt received semi-supine in bed, NAD, +sitter, +hep-lock, +CB, +alarm. Cleared by NORMAN Stovall to be seen. Pt requires 1 person assist for transfers, ambulation, and ADLs using 3-wheeled rollator. WBAT on RLE with surgical shoe.

## 2024-02-08 NOTE — DIETITIAN INITIAL EVALUATION ADULT - NSPROEDALEARNPREF_GEN_A_NUR
Encouraged pt for adequate protein intake in the diet, with consistent carbohydrates. Educated to prefer consumption of sugar free drinks. importance of adequate protein, food sources of protein, consistent carbohydrates, sugar-free sweets/verbal instruction

## 2024-02-08 NOTE — PHYSICAL THERAPY INITIAL EVALUATION ADULT - ADDITIONAL COMMENTS
Pt lives in senior care, PLOF unknown 2/2 pt cognitive deficits. Pt reports he used to walk multiple blocks but has not recently.

## 2024-02-09 LAB
-  CLINDAMYCIN: SIGNIFICANT CHANGE UP
-  ERYTHROMYCIN: SIGNIFICANT CHANGE UP
-  LINEZOLID: SIGNIFICANT CHANGE UP
-  OXACILLIN: SIGNIFICANT CHANGE UP
-  RIFAMPIN: SIGNIFICANT CHANGE UP
-  TRIMETHOPRIM/SULFAMETHOXAZOLE: SIGNIFICANT CHANGE UP
-  VANCOMYCIN: SIGNIFICANT CHANGE UP
CULTURE RESULTS: ABNORMAL
METHOD TYPE: SIGNIFICANT CHANGE UP
ORGANISM # SPEC MICROSCOPIC CNT: ABNORMAL
ORGANISM # SPEC MICROSCOPIC CNT: SIGNIFICANT CHANGE UP
SPECIMEN SOURCE: SIGNIFICANT CHANGE UP

## 2024-02-12 LAB
CULTURE RESULTS: SIGNIFICANT CHANGE UP
CULTURE RESULTS: SIGNIFICANT CHANGE UP
SPECIMEN SOURCE: SIGNIFICANT CHANGE UP
SPECIMEN SOURCE: SIGNIFICANT CHANGE UP

## 2024-02-15 DIAGNOSIS — S90.111A CONTUSION OF RIGHT GREAT TOE WITHOUT DAMAGE TO NAIL, INITIAL ENCOUNTER: ICD-10-CM
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
## 2024-02-15 DIAGNOSIS — E11.9 TYPE 2 DIABETES MELLITUS WITHOUT COMPLICATIONS: ICD-10-CM
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
## 2024-02-15 DIAGNOSIS — B95.7 OTHER STAPHYLOCOCCUS AS THE CAUSE OF DISEASES CLASSIFIED ELSEWHERE: ICD-10-CM
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
## 2024-02-15 DIAGNOSIS — N40.0 BENIGN PROSTATIC HYPERPLASIA WITHOUT LOWER URINARY TRACT SYMPTOMS: ICD-10-CM

## 2024-02-15 DIAGNOSIS — Z41.8 ENCOUNTER FOR OTHER PROCEDURES FOR PURPOSES OTHER THAN REMEDYING HEALTH STATE: ICD-10-CM

## 2024-02-15 DIAGNOSIS — Z79.84 LONG TERM (CURRENT) USE OF ORAL HYPOGLYCEMIC DRUGS: ICD-10-CM

## 2024-02-15 DIAGNOSIS — L03.031 CELLULITIS OF RIGHT TOE: ICD-10-CM

## 2024-02-15 DIAGNOSIS — I10 ESSENTIAL (PRIMARY) HYPERTENSION: ICD-10-CM

## 2024-02-15 DIAGNOSIS — E78.5 HYPERLIPIDEMIA, UNSPECIFIED: ICD-10-CM

## 2024-08-15 ENCOUNTER — APPOINTMENT (OUTPATIENT)
Dept: NEUROLOGY | Facility: CLINIC | Age: 89
End: 2024-08-15

## 2025-02-28 NOTE — ED ADULT NURSE NOTE - NSFALLRSKASSESASSIST_ED_ALL_ED
Detail Level: Zone Sunscreen Recommendations: Recommend broad spectrum SPF 30+. Detail Level: Generalized Detail Level: Simple yes

## 2025-03-14 NOTE — ED PROVIDER NOTE - OBJECTIVE STATEMENT
86 y/o M with PMHx of DM BPH presents to the ED due to chest pain, states he has been having pain across his chest which has been constant for the past day and a half. Patient states that he goes to the gym daily and does elliptical, 2 days ago started to have pain and did not exercise for as long as he usually does, today with pain that is continued so did not exercise at all. Describes pain as tightness which is worsened with taking deep breaths, took 1 tablet of Tylenol yesterday. No meds taken today. Well appearing in ED, no cardiac hx, VSS.
Prophylactic measure